# Patient Record
Sex: MALE | Race: BLACK OR AFRICAN AMERICAN | NOT HISPANIC OR LATINO | URBAN - METROPOLITAN AREA
[De-identification: names, ages, dates, MRNs, and addresses within clinical notes are randomized per-mention and may not be internally consistent; named-entity substitution may affect disease eponyms.]

---

## 2019-06-21 ENCOUNTER — INPATIENT (INPATIENT)
Facility: HOSPITAL | Age: 84
LOS: 3 days | Discharge: HOME | End: 2019-06-25
Attending: INTERNAL MEDICINE | Admitting: INTERNAL MEDICINE
Payer: MEDICARE

## 2019-06-21 VITALS
SYSTOLIC BLOOD PRESSURE: 102 MMHG | RESPIRATION RATE: 18 BRPM | HEART RATE: 75 BPM | TEMPERATURE: 98 F | DIASTOLIC BLOOD PRESSURE: 49 MMHG | OXYGEN SATURATION: 100 %

## 2019-06-21 DIAGNOSIS — R39.89 OTHER SYMPTOMS AND SIGNS INVOLVING THE GENITOURINARY SYSTEM: Chronic | ICD-10-CM

## 2019-06-21 LAB
ALBUMIN SERPL ELPH-MCNC: 4 G/DL — SIGNIFICANT CHANGE UP (ref 3.3–5)
ALP SERPL-CCNC: 79 U/L — SIGNIFICANT CHANGE UP (ref 40–120)
ALT FLD-CCNC: 38 U/L — SIGNIFICANT CHANGE UP (ref 4–41)
ANION GAP SERPL CALC-SCNC: 11 MMO/L — SIGNIFICANT CHANGE UP (ref 7–14)
APPEARANCE UR: CLEAR — SIGNIFICANT CHANGE UP
APTT BLD: 28.7 SEC — SIGNIFICANT CHANGE UP (ref 27.5–36.3)
AST SERPL-CCNC: 52 U/L — HIGH (ref 4–40)
BACTERIA # UR AUTO: SIGNIFICANT CHANGE UP
BASOPHILS # BLD AUTO: 0.04 K/UL — SIGNIFICANT CHANGE UP (ref 0–0.2)
BASOPHILS NFR BLD AUTO: 0.2 % — SIGNIFICANT CHANGE UP (ref 0–2)
BASOPHILS NFR SPEC: 0 % — SIGNIFICANT CHANGE UP (ref 0–2)
BILIRUB SERPL-MCNC: 0.5 MG/DL — SIGNIFICANT CHANGE UP (ref 0.2–1.2)
BILIRUB UR-MCNC: NEGATIVE — SIGNIFICANT CHANGE UP
BLASTS # FLD: 0 % — SIGNIFICANT CHANGE UP (ref 0–0)
BLOOD UR QL VISUAL: NEGATIVE — SIGNIFICANT CHANGE UP
BUN SERPL-MCNC: 18 MG/DL — SIGNIFICANT CHANGE UP (ref 7–23)
CALCIUM SERPL-MCNC: 9.8 MG/DL — SIGNIFICANT CHANGE UP (ref 8.4–10.5)
CHLORIDE SERPL-SCNC: 107 MMOL/L — SIGNIFICANT CHANGE UP (ref 98–107)
CO2 SERPL-SCNC: 25 MMOL/L — SIGNIFICANT CHANGE UP (ref 22–31)
COLOR SPEC: YELLOW — SIGNIFICANT CHANGE UP
CREAT SERPL-MCNC: 1.02 MG/DL — SIGNIFICANT CHANGE UP (ref 0.5–1.3)
EOSINOPHIL # BLD AUTO: 0.02 K/UL — SIGNIFICANT CHANGE UP (ref 0–0.5)
EOSINOPHIL NFR BLD AUTO: 0.1 % — SIGNIFICANT CHANGE UP (ref 0–6)
EOSINOPHIL NFR FLD: 0 % — SIGNIFICANT CHANGE UP (ref 0–6)
GIANT PLATELETS BLD QL SMEAR: PRESENT — SIGNIFICANT CHANGE UP
GLUCOSE SERPL-MCNC: 123 MG/DL — HIGH (ref 70–99)
GLUCOSE UR-MCNC: NEGATIVE — SIGNIFICANT CHANGE UP
HCT VFR BLD CALC: 37.4 % — LOW (ref 39–50)
HGB BLD-MCNC: 11.9 G/DL — LOW (ref 13–17)
IMM GRANULOCYTES NFR BLD AUTO: 1 % — SIGNIFICANT CHANGE UP (ref 0–1.5)
INR BLD: 1.47 — HIGH (ref 0.88–1.17)
KETONES UR-MCNC: NEGATIVE — SIGNIFICANT CHANGE UP
LEUKOCYTE ESTERASE UR-ACNC: NEGATIVE — SIGNIFICANT CHANGE UP
LYMPHOCYTES # BLD AUTO: 1.08 K/UL — SIGNIFICANT CHANGE UP (ref 1–3.3)
LYMPHOCYTES # BLD AUTO: 5.2 % — LOW (ref 13–44)
LYMPHOCYTES NFR SPEC AUTO: 6.1 % — LOW (ref 13–44)
MANUAL SMEAR VERIFICATION: SIGNIFICANT CHANGE UP
MCHC RBC-ENTMCNC: 30.6 PG — SIGNIFICANT CHANGE UP (ref 27–34)
MCHC RBC-ENTMCNC: 31.8 % — LOW (ref 32–36)
MCV RBC AUTO: 96.1 FL — SIGNIFICANT CHANGE UP (ref 80–100)
METAMYELOCYTES # FLD: 0 % — SIGNIFICANT CHANGE UP (ref 0–1)
MONOCYTES # BLD AUTO: 0.7 K/UL — SIGNIFICANT CHANGE UP (ref 0–0.9)
MONOCYTES NFR BLD AUTO: 3.4 % — SIGNIFICANT CHANGE UP (ref 2–14)
MONOCYTES NFR BLD: 2.6 % — SIGNIFICANT CHANGE UP (ref 2–9)
MORPHOLOGY BLD-IMP: NORMAL — SIGNIFICANT CHANGE UP
MUCOUS THREADS # UR AUTO: SIGNIFICANT CHANGE UP
MYELOCYTES NFR BLD: 0 % — SIGNIFICANT CHANGE UP (ref 0–0)
NEUTROPHIL AB SER-ACNC: 80 % — HIGH (ref 43–77)
NEUTROPHILS # BLD AUTO: 18.79 K/UL — HIGH (ref 1.8–7.4)
NEUTROPHILS NFR BLD AUTO: 90.1 % — HIGH (ref 43–77)
NEUTS BAND # BLD: 9.6 % — HIGH (ref 0–6)
NITRITE UR-MCNC: NEGATIVE — SIGNIFICANT CHANGE UP
NRBC # FLD: 0 K/UL — SIGNIFICANT CHANGE UP (ref 0–0)
OTHER - HEMATOLOGY %: 0 — SIGNIFICANT CHANGE UP
PH UR: 6 — SIGNIFICANT CHANGE UP (ref 5–8)
PLATELET # BLD AUTO: 196 K/UL — SIGNIFICANT CHANGE UP (ref 150–400)
PLATELET COUNT - ESTIMATE: NORMAL — SIGNIFICANT CHANGE UP
PMV BLD: 9.9 FL — SIGNIFICANT CHANGE UP (ref 7–13)
POTASSIUM SERPL-MCNC: 3.7 MMOL/L — SIGNIFICANT CHANGE UP (ref 3.5–5.3)
POTASSIUM SERPL-SCNC: 3.7 MMOL/L — SIGNIFICANT CHANGE UP (ref 3.5–5.3)
PROMYELOCYTES # FLD: 0 % — SIGNIFICANT CHANGE UP (ref 0–0)
PROT SERPL-MCNC: 7.6 G/DL — SIGNIFICANT CHANGE UP (ref 6–8.3)
PROT UR-MCNC: 200 — HIGH
PROTHROM AB SERPL-ACNC: 16.5 SEC — HIGH (ref 9.8–13.1)
RBC # BLD: 3.89 M/UL — LOW (ref 4.2–5.8)
RBC # FLD: 12.7 % — SIGNIFICANT CHANGE UP (ref 10.3–14.5)
RBC CASTS # UR COMP ASSIST: HIGH (ref 0–?)
REVIEW TO FOLLOW: YES — SIGNIFICANT CHANGE UP
SMUDGE CELLS # BLD: PRESENT — SIGNIFICANT CHANGE UP
SODIUM SERPL-SCNC: 143 MMOL/L — SIGNIFICANT CHANGE UP (ref 135–145)
SP GR SPEC: 1.04 — SIGNIFICANT CHANGE UP (ref 1–1.04)
SQUAMOUS # UR AUTO: SIGNIFICANT CHANGE UP
TROPONIN T, HIGH SENSITIVITY: < 6 NG/L — SIGNIFICANT CHANGE UP (ref ?–14)
UROBILINOGEN FLD QL: NORMAL — SIGNIFICANT CHANGE UP
VARIANT LYMPHS # BLD: 1.7 % — SIGNIFICANT CHANGE UP
WBC # BLD: 20.84 K/UL — HIGH (ref 3.8–10.5)
WBC # FLD AUTO: 20.84 K/UL — HIGH (ref 3.8–10.5)
WBC UR QL: HIGH (ref 0–?)

## 2019-06-21 PROCEDURE — 71046 X-RAY EXAM CHEST 2 VIEWS: CPT | Mod: 26

## 2019-06-21 RX ORDER — SODIUM CHLORIDE 9 MG/ML
1000 INJECTION INTRAMUSCULAR; INTRAVENOUS; SUBCUTANEOUS ONCE
Refills: 0 | Status: COMPLETED | OUTPATIENT
Start: 2019-06-21 | End: 2019-06-21

## 2019-06-21 RX ADMIN — SODIUM CHLORIDE 1000 MILLILITER(S): 9 INJECTION INTRAMUSCULAR; INTRAVENOUS; SUBCUTANEOUS at 23:56

## 2019-06-21 NOTE — ED PROVIDER NOTE - ATTENDING CONTRIBUTION TO CARE
DR. BLOCH, ATTENDING MD-  I performed a face to face bedside interview with patient regarding history of present illness, review of symptoms and past medical history. I completed an independent physical exam.  I have discussed patient's plan of care with the resident.  Patient examined well appearing NAD HEENT nml lungs clear, heart sounds nml lungs clear, nonfocal neuro exam, skin nml pulses nml. abd soft nontender, no CVA tenderness, oriented x 2.

## 2019-06-21 NOTE — ED PROVIDER NOTE - PHYSICAL EXAMINATION
Gen: AAOx2, non-toxic  Head: NCAT  HEENT: EOMI, PERRLA, oral mucosa moist, normal conjunctiva  Lung: CTAB, no respiratory distress, no wheezes/rhonchi/rales B/L, speaking in full sentences  CV: RRR, no murmurs, rubs or gallops  Abd: soft, NTND, no guarding, no CVA tenderness, no rebound tenderness  MSK: no visible deformities, full range of motion of all 4 exts  Neuro: No focal sensory or motor deficits  Skin: Warm, well perfused, no rash  Psych: normal affect.   ~Elier Wells MD (PGY1)

## 2019-06-21 NOTE — ED PROVIDER NOTE - CLINICAL SUMMARY MEDICAL DECISION MAKING FREE TEXT BOX
Elier Wells MD PGY1: 82 yo M Mauritian speaking translate id ([id number])  pmh of  dementia, BPH p/w syncopal episode x 2 hrs ago. c/o genital pain. Syncope 2nd to infxn like a UTI. cbc, cmp, ua, cxr

## 2019-06-21 NOTE — ED ADULT NURSE NOTE - OBJECTIVE STATEMENT
83yom a&ox2 (to self and place, baseline per family at bedside) brought to ed for syncopal episode this evening. pt daughter at bedside reports witnessing event, reports catching pt so he did not fall. pt family states he was unresponsive for approx 3 minutes then woke up. pt denies any s/s before episode. difficult to optain hx 2/2 dementia. pt family reports pt has been c/o genital pain/pain w/ urination. family denies incontinent episodes. pt currently breathing even/unlabored. abdomen soft, nontender, nondistended. skin warm, dry, and intact. 18g iv lock placed to L ac. labs sent. pt nsr on cardiac monitor. MD at bedside for further eval. will continue to monitor.

## 2019-06-21 NOTE — ED ADULT TRIAGE NOTE - CHIEF COMPLAINT QUOTE
f/s in field 119  had BM just prior to arrival passed after BM    pt denies any pain pt sometimes forgets to eat

## 2019-06-21 NOTE — ED PROVIDER NOTE - OBJECTIVE STATEMENT
82 yo M Tongan speaking translate id pmh of  p/w syncopal episode 84 yo M Tajik speaking translate id ([id number])  pmh of  dementia, BPH p/w syncopal episode x 2 hrs ago. Pt's daughter and wife at bed side states that pt was siting at the table where off balanced and wobbly then slough down and was unresponsive for about 3mins. Pt's family state that pt woke up n seem lethargic for 1 min and the called 911. Pt family states that pt has not been eating much and had fever for most of the day yesterday. Pt c/o pain in his private area.

## 2019-06-22 DIAGNOSIS — N40.0 BENIGN PROSTATIC HYPERPLASIA WITHOUT LOWER URINARY TRACT SYMPTOMS: ICD-10-CM

## 2019-06-22 DIAGNOSIS — J18.9 PNEUMONIA, UNSPECIFIED ORGANISM: ICD-10-CM

## 2019-06-22 DIAGNOSIS — F03.90 UNSPECIFIED DEMENTIA WITHOUT BEHAVIORAL DISTURBANCE: ICD-10-CM

## 2019-06-22 DIAGNOSIS — A41.9 SEPSIS, UNSPECIFIED ORGANISM: ICD-10-CM

## 2019-06-22 DIAGNOSIS — R55 SYNCOPE AND COLLAPSE: ICD-10-CM

## 2019-06-22 DIAGNOSIS — Z90.79 ACQUIRED ABSENCE OF OTHER GENITAL ORGAN(S): Chronic | ICD-10-CM

## 2019-06-22 DIAGNOSIS — Z29.9 ENCOUNTER FOR PROPHYLACTIC MEASURES, UNSPECIFIED: ICD-10-CM

## 2019-06-22 DIAGNOSIS — E86.0 DEHYDRATION: ICD-10-CM

## 2019-06-22 DIAGNOSIS — R41.82 ALTERED MENTAL STATUS, UNSPECIFIED: ICD-10-CM

## 2019-06-22 LAB
ANION GAP SERPL CALC-SCNC: 15 MMO/L — HIGH (ref 7–14)
B PERT DNA SPEC QL NAA+PROBE: NOT DETECTED — SIGNIFICANT CHANGE UP
BUN SERPL-MCNC: 13 MG/DL — SIGNIFICANT CHANGE UP (ref 7–23)
C PNEUM DNA SPEC QL NAA+PROBE: NOT DETECTED — SIGNIFICANT CHANGE UP
CALCIUM SERPL-MCNC: 9.7 MG/DL — SIGNIFICANT CHANGE UP (ref 8.4–10.5)
CHLORIDE SERPL-SCNC: 108 MMOL/L — HIGH (ref 98–107)
CHOLEST SERPL-MCNC: 104 MG/DL — LOW (ref 120–199)
CO2 SERPL-SCNC: 19 MMOL/L — LOW (ref 22–31)
CREAT SERPL-MCNC: 0.89 MG/DL — SIGNIFICANT CHANGE UP (ref 0.5–1.3)
FLUAV H1 2009 PAND RNA SPEC QL NAA+PROBE: NOT DETECTED — SIGNIFICANT CHANGE UP
FLUAV H1 RNA SPEC QL NAA+PROBE: NOT DETECTED — SIGNIFICANT CHANGE UP
FLUAV H3 RNA SPEC QL NAA+PROBE: NOT DETECTED — SIGNIFICANT CHANGE UP
FLUAV SUBTYP SPEC NAA+PROBE: NOT DETECTED — SIGNIFICANT CHANGE UP
FLUBV RNA SPEC QL NAA+PROBE: NOT DETECTED — SIGNIFICANT CHANGE UP
GLUCOSE SERPL-MCNC: 83 MG/DL — SIGNIFICANT CHANGE UP (ref 70–99)
HADV DNA SPEC QL NAA+PROBE: NOT DETECTED — SIGNIFICANT CHANGE UP
HBA1C BLD-MCNC: 5.9 % — HIGH (ref 4–5.6)
HCOV PNL SPEC NAA+PROBE: SIGNIFICANT CHANGE UP
HDLC SERPL-MCNC: 53 MG/DL — SIGNIFICANT CHANGE UP (ref 35–55)
HMPV RNA SPEC QL NAA+PROBE: NOT DETECTED — SIGNIFICANT CHANGE UP
HPIV1 RNA SPEC QL NAA+PROBE: NOT DETECTED — SIGNIFICANT CHANGE UP
HPIV2 RNA SPEC QL NAA+PROBE: NOT DETECTED — SIGNIFICANT CHANGE UP
HPIV3 RNA SPEC QL NAA+PROBE: NOT DETECTED — SIGNIFICANT CHANGE UP
HPIV4 RNA SPEC QL NAA+PROBE: NOT DETECTED — SIGNIFICANT CHANGE UP
LIPID PNL WITH DIRECT LDL SERPL: 48 MG/DL — SIGNIFICANT CHANGE UP
MAGNESIUM SERPL-MCNC: 2.1 MG/DL — SIGNIFICANT CHANGE UP (ref 1.6–2.6)
POTASSIUM SERPL-MCNC: 3.9 MMOL/L — SIGNIFICANT CHANGE UP (ref 3.5–5.3)
POTASSIUM SERPL-SCNC: 3.9 MMOL/L — SIGNIFICANT CHANGE UP (ref 3.5–5.3)
RSV RNA SPEC QL NAA+PROBE: NOT DETECTED — SIGNIFICANT CHANGE UP
RV+EV RNA SPEC QL NAA+PROBE: NOT DETECTED — SIGNIFICANT CHANGE UP
SODIUM SERPL-SCNC: 142 MMOL/L — SIGNIFICANT CHANGE UP (ref 135–145)
TRIGL SERPL-MCNC: 46 MG/DL — SIGNIFICANT CHANGE UP (ref 10–149)
TROPONIN T, HIGH SENSITIVITY: < 6 NG/L — SIGNIFICANT CHANGE UP (ref ?–14)
TSH SERPL-MCNC: 4.67 UIU/ML — HIGH (ref 0.27–4.2)

## 2019-06-22 RX ORDER — ACETAMINOPHEN 500 MG
650 TABLET ORAL EVERY 6 HOURS
Refills: 0 | Status: DISCONTINUED | OUTPATIENT
Start: 2019-06-22 | End: 2019-06-25

## 2019-06-22 RX ORDER — CEFTRIAXONE 500 MG/1
1000 INJECTION, POWDER, FOR SOLUTION INTRAMUSCULAR; INTRAVENOUS ONCE
Refills: 0 | Status: COMPLETED | OUTPATIENT
Start: 2019-06-22 | End: 2019-06-22

## 2019-06-22 RX ORDER — SODIUM CHLORIDE 9 MG/ML
1000 INJECTION INTRAMUSCULAR; INTRAVENOUS; SUBCUTANEOUS
Refills: 0 | Status: DISCONTINUED | OUTPATIENT
Start: 2019-06-22 | End: 2019-06-24

## 2019-06-22 RX ORDER — DONEPEZIL HYDROCHLORIDE 10 MG/1
5 TABLET, FILM COATED ORAL AT BEDTIME
Refills: 0 | Status: DISCONTINUED | OUTPATIENT
Start: 2019-06-22 | End: 2019-06-25

## 2019-06-22 RX ORDER — TAMSULOSIN HYDROCHLORIDE 0.4 MG/1
0.4 CAPSULE ORAL AT BEDTIME
Refills: 0 | Status: DISCONTINUED | OUTPATIENT
Start: 2019-06-22 | End: 2019-06-25

## 2019-06-22 RX ORDER — CEFTRIAXONE 500 MG/1
1000 INJECTION, POWDER, FOR SOLUTION INTRAMUSCULAR; INTRAVENOUS EVERY 24 HOURS
Refills: 0 | Status: DISCONTINUED | OUTPATIENT
Start: 2019-06-23 | End: 2019-06-25

## 2019-06-22 RX ORDER — ENOXAPARIN SODIUM 100 MG/ML
40 INJECTION SUBCUTANEOUS EVERY 24 HOURS
Refills: 0 | Status: DISCONTINUED | OUTPATIENT
Start: 2019-06-22 | End: 2019-06-25

## 2019-06-22 RX ORDER — IPRATROPIUM/ALBUTEROL SULFATE 18-103MCG
3 AEROSOL WITH ADAPTER (GRAM) INHALATION EVERY 6 HOURS
Refills: 0 | Status: DISCONTINUED | OUTPATIENT
Start: 2019-06-22 | End: 2019-06-25

## 2019-06-22 RX ORDER — AZITHROMYCIN 500 MG/1
500 TABLET, FILM COATED ORAL EVERY 24 HOURS
Refills: 0 | Status: DISCONTINUED | OUTPATIENT
Start: 2019-06-23 | End: 2019-06-24

## 2019-06-22 RX ORDER — AZITHROMYCIN 500 MG/1
500 TABLET, FILM COATED ORAL ONCE
Refills: 0 | Status: COMPLETED | OUTPATIENT
Start: 2019-06-22 | End: 2019-06-22

## 2019-06-22 RX ADMIN — SODIUM CHLORIDE 75 MILLILITER(S): 9 INJECTION INTRAMUSCULAR; INTRAVENOUS; SUBCUTANEOUS at 02:01

## 2019-06-22 RX ADMIN — CEFTRIAXONE 100 MILLIGRAM(S): 500 INJECTION, POWDER, FOR SOLUTION INTRAMUSCULAR; INTRAVENOUS at 00:14

## 2019-06-22 RX ADMIN — Medication 3 MILLILITER(S): at 15:47

## 2019-06-22 RX ADMIN — AZITHROMYCIN 250 MILLIGRAM(S): 500 TABLET, FILM COATED ORAL at 02:00

## 2019-06-22 RX ADMIN — Medication 1 MILLIGRAM(S): at 18:14

## 2019-06-22 RX ADMIN — ENOXAPARIN SODIUM 40 MILLIGRAM(S): 100 INJECTION SUBCUTANEOUS at 22:47

## 2019-06-22 RX ADMIN — Medication 1 MILLIGRAM(S): at 10:02

## 2019-06-22 NOTE — H&P ADULT - NSHPLANGTRANSLATORFT_GEN_A_CORE
Pt understands some English but is confused secondary to Dementia, son at bedside translated Pt understands some English but is more confused that his baseline Dementia, son at bedside translated along with daughter, grandson, and granddaughter

## 2019-06-22 NOTE — H&P ADULT - HISTORY OF PRESENT ILLNESS
82 y/o Cayman Islander speaking male (understands some English), with a PmHx of Dementia, BPH s/p TURP, presented to the [facility] ED with syncope. Pt is very confused and son at bedside was able to translate and provide information. Son states his father normally stays with him in [place] but was staying with his sister in [place] the last two nights because for the past few days his was c/o fever and a cough and they planning on taking him to his PCP in [place] this coming Monday. Two nights ago son states he was taking Nyquil to help with the fever, coughing and help him sleep. Last night, he was sitting at the table watching television and his daughter's house when his daughter called his name and he wasn't answering. As per pts son, his sister had stated after he wasn't responding to her (she was sitting across the room from him), she had gone to check up on him and he was unresponsive for about 2-3 minutes so she called 911.   As per son, pt denied any chest pain, sob, HA, dizziness, blurred vision, n/v, abd pain. Neg recent travel or sick contacts. Only complaint was coughing, decreased appetitie and fever over the past two days. In the [facility] ED, he was found to have a WBC of 20.84 and a CXR that was showing pneumonia. He was give Azithromycin and Ceftriaxone. He appears comfortable at this time and is being admitted to telemetry for syncope r/o acs and for Pneumonia.    =>Pt has advanced Dementia and was getting agitated in the ED. He was placed on a 1:1 for safety and then family showed up and he calmed down. 82 y/o Bruneian speaking male (understands some English), with a PmHx of Dementia, BPH s/p TURP, presented to the [facility] ED with syncope. Pt is very confused and son at bedside was able to translate and provide information. Son states his father normally stays with him in [place] but was staying with his sister in [place] the last two nights because for the past few days his was c/o fever and a cough and they planning on taking him to his PCP in [place] this coming Monday. Two nights ago son states he was taking Nyquil to help with the fever, coughing and help him sleep. Last night, he was sitting at the table watching television and his daughter's house when his daughter called his name and he wasn't answering. As per pts son, his sister had stated after he wasn't responding to her (she was sitting across the room from him), she had gone to check up on him and he was unresponsive for about 2-3 minutes so she called 911.   As per son, pt denied any chest pain, sob, HA, dizziness, blurred vision, n/v, abd pain. Neg recent travel or sick contacts. Only complaint was coughing, decreased appetitie and fever over the past two days. In the [facility] ED, he was found to have a WBC of 20.84 and a CXR that was showing pneumonia. He was give Azithromycin and Ceftriaxone. He appears comfortable at this time and is being admitted to telemetry for syncope r/o acs and for Pneumonia.  =>Pt has advanced Dementia and was getting agitated in the ED. He was placed on a 1:1 for safety and then family showed up and he calmed down.

## 2019-06-22 NOTE — PROVIDER CONTACT NOTE (OTHER) - SITUATION
Pt took off leads. Stood up on two legs on top of stretcher attempting to jump out. Nurse manager Jeni, and two PCAS had to prevent him from falling

## 2019-06-22 NOTE — H&P ADULT - NSHPSOCIALHISTORY_GEN_ALL_CORE
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

## 2019-06-22 NOTE — H&P ADULT - GASTROINTESTINAL DETAILS
no distention/no masses palpable/bowel sounds normal/nontender/no rebound tenderness/soft/no guarding

## 2019-06-22 NOTE — H&P ADULT - ATTENDING COMMENTS
I agree with the above information, changes made above as needed  in addition, pt admitted for:  - sepsis 2/2 bacterial pneumonia - iv abx, ivf, f/u labs, optimize lytes, f/u cultures, supportive care prn  - altered mental status - confusion - likely 2/2 active infectious state, treat underlying infection, fluid hydration, if abnormal mental status persists after 24 hours then will consider additional management/imaging   - dementia - keep surroundings intact, fall and aspiration precautions   - syncope and collapse - unspecified, f/u tte, tele  - dehydration - fluid hydration   adjust management per consultants   all medical team members management appreciated

## 2019-06-22 NOTE — H&P ADULT - PROBLEM SELECTOR PLAN 1
EKG/Telemetry, f/u ce x 2, mg, tsh, echo, CT head 2/2 bacterial pneumonia  iv abx, fluids, supportive care, f/u cultures

## 2019-06-22 NOTE — H&P ADULT - ASSESSMENT
84 y/o Polish speaking male (understands some English), with a PmHx of Dementia, BPH s/p TURP, presented to the [facility] ED with syncope. Admitted to telemetry for r/o acs and for Pneumonia.

## 2019-06-22 NOTE — PROVIDER CONTACT NOTE (OTHER) - ASSESSMENT
pt is not following commands. Continuously pulling off leads and trying to leave. stating "he is going home"

## 2019-06-22 NOTE — H&P ADULT - NSHPOUTPATIENTPROVIDERS_GEN_ALL_CORE
PCP: Dr. Renita Dhillon [phone] PCP: Dr. Renita Dhillon [phone] who admits to [place]

## 2019-06-22 NOTE — H&P ADULT - NEGATIVE CARDIOVASCULAR SYMPTOMS
no palpitations/no paroxysmal nocturnal dyspnea/no dyspnea on exertion/no peripheral edema/no chest pain

## 2019-06-22 NOTE — H&P ADULT - NSHPPHYSICALEXAM_GEN_ALL_CORE
Vital Signs Last 24 Hrs  T(C): 36.5 (22 Jun 2019 06:08), Max: 37.1 (21 Jun 2019 21:48)  T(F): 97.7 (22 Jun 2019 06:08), Max: 98.7 (21 Jun 2019 21:48)  HR: 88 (22 Jun 2019 06:08) (66 - 88)  BP: 104/64 (22 Jun 2019 06:08) (102/49 - 125/56)  BP(mean): --  RR: 17 (22 Jun 2019 06:08) (16 - 18)  SpO2: 99% (22 Jun 2019 06:08) (96% - 100%)    EKG: NSR @ 72, neg changes

## 2019-06-22 NOTE — H&P ADULT - BACK COMMENTS
noted a lipoma (non-tender and mobile) to the upper right thoracic region of his back - pt's son stated it has been there for about 10 years

## 2019-06-22 NOTE — CONSULT NOTE ADULT - SUBJECTIVE AND OBJECTIVE BOX
HISTORY OF PRESENT ILLNESS: HPI:    82 y/o Tajik speaking male (understands some English), translation obtained from his daughter and grandchildren, with a PMHx no significant cardiac hx, or know CAD, Dementia, BPH s/p TURP, presented to the [facility] ED with syncope. Per daughter, patient was staying in [place] the last two nights because for the past few days because he was c/o fever and a cough, has an appointment with his PCP in [place] this coming Monday. Two nights ago son states he was taking Nyquil to help with the fever, coughing and help him sleep, decreased appetite. Last night, he was sitting at the table watching television and his daughter's house when she called his name and he wasn't answering. As per pt's daughter he was not responding, lost consciousness for a couple of minutes, then 911 ws called. Per ED documentation, pt with WBC of 20.84 and a CXR that was showing pneumonia. Abx started. Pt has advanced Dementia and was getting agitated in the ED. He was placed on a 1:1 for safety, calm with family at bedside during interview/assessment. On assessment patient denies chest pain, endorses sob at times, no HA, dizziness, lightheadedness, vision changes, n/v/c abdominal pain, sick contacts or recent travel. Cardiology consulted for r/o ACS.     PAST MEDICAL & SURGICAL HISTORY:  Dementia  Enlarged prostate  S/P TURP (transurethral resection of prostate)    MEDICATIONS:  MEDICATIONS  (STANDING):  ALBUTerol/ipratropium for Nebulization 3 milliLiter(s) Nebulizer every 6 hours  donepezil 5 milliGRAM(s) Oral at bedtime  enoxaparin Injectable 40 milliGRAM(s) SubCutaneous every 24 hours  sodium chloride 0.9%. 1000 milliLiter(s) (75 mL/Hr) IV Continuous <Continuous>  tamsulosin 0.4 milliGRAM(s) Oral at bedtime    Allergies    No Known Allergies    FAMILY HISTORY:  No significant family history    Non-contributary for premature coronary disease or sudden cardiac death    SOCIAL HISTORY:    [X ] Non-smoker  [ ] Smoker  [ ] Alcohol      REVIEW OF SYSTEMS:  [ X]chest pain  [  ]shortness of breath  [  ]palpitations  [  ]syncope  [ ]near syncope [ ]upper extremity weakness   [ ] lower extremity weakness  [  ]diplopia  [  ]altered mental status   [  ]fevers  [ ]chills [ ]nausea  [ ]vomitting  [  ]dysphagia    [ ]abdominal pain  [ ]melena  [ ]BRBPR    [  ]epistaxis  [  ]rash    [ ]lower extremity edema        [X ] All others negative	- per family, unable to obtain from patient 2/2 language and hx of dementia   [ ] Unable to obtain      LABS:	 	    CARDIAC MARKERS:                        11.9   20.84 )-----------( 196      ( 21 Jun 2019 21:41 )             37.4     Hb Trend: 11.9<--    06-22    142  |  108<H>  |  13  ----------------------------<  83  3.9   |  19<L>  |  0.89    Ca    9.7      22 Jun 2019 09:30  Mg     2.1     06-22    TPro  7.6  /  Alb  4.0  /  TBili  0.5  /  DBili  x   /  AST  52<H>  /  ALT  38  /  AlkPhos  79  06-21    Creatinine Trend: 0.89<--, 1.02<--    Coags:  PT/INR - ( 21 Jun 2019 21:41 )   PT: 16.5 SEC;   INR: 1.47        PTT - ( 21 Jun 2019 21:41 )  PTT:28.7 SEC    proBNP:   Lipid Profile:   HgA1c: Hemoglobin A1C, Whole Blood: 5.9 % (06-22 @ 09:30)    TSH: Thyroid Stimulating Hormone, Serum: 4.67 uIU/mL (06-22 @ 09:30)    PHYSICAL EXAM:  T(C): 36.9 (06-22-19 @ 15:25), Max: 37.1 (06-21-19 @ 21:48)  HR: 84 (06-22-19 @ 15:47) (66 - 88)  BP: 120/62 (06-22-19 @ 15:25) (102/49 - 125/56)  RR: 16 (06-22-19 @ 15:25) (16 - 18)  SpO2: 97% (06-22-19 @ 15:47) (96% - 100%)  Wt(kg): --    I&O's Summary    CV: N S1 S2 1/6 SHAYY (+)2 Pulses B/l  Resp:  diminished BS B/L, normal effort  GI: (+) BS Soft, NT, ND  Lymph:  (-)Edema, (-)obvious lymphadenopathy  Skin: Warm to touch, Normal turgor    TELEMETRY: 	SR       ECG:  	NSR 72, P-R 162,  ms     RADIOLOGY:         CXR:   < from: Xray Chest 2 Views PA/Lat (06.21.19 @ 22:42) >  IMPRESSION:  Left basilar/retrocardiac opacity likely representing subsegmental   atelectasis versus pneumonia, also correlate with findings on already   pending chest CT. Clear remaining visualized lungs. No pleural effusions   or pneumothorax.    Aortic arch calcifications again noted. Cardiac and mediastinal grossly   within normal limits.    Trachea midline.    Generalized osteopenia and spinal degenerative change again noted.    HANNAH PEREA M.D., RADIOLOGY RESIDENT  This document has been electronically signed.  HASEEB ROGEL M.D., ATTENDING RADIOLOGIST  This document has been electronically signed. Jun 22 2019 10:08AM    < end of copied text >      ASSESSMENT/PLAN: 	    82 y/o Tajik speaking male (understands some English), translation obtained from his daughter and grandchildren, with a PMHx no significant cardiac hx, or know CAD, Dementia, BPH s/p TURP, presented to the [facility] ED with syncope. Cardiology consulted for r/o ACS.    -- no chest pain, intermittent sob  -- Trop HS 6 --> 6, doubt ACS, symptoms c/w underlying infectious process, PNA  -- recommend echo, eval lv function, structural heart   -- elevated TSH 4.67, f/u per primary team, endo consult?  -- leukocytosis w/u, f/u per primary team   -- CT Head r/o CVA, CT Chest eval PNA on xray   -- orthostatics if able, abx per primary team   -- further cardiac work up pending above                 --

## 2019-06-22 NOTE — H&P ADULT - NEGATIVE OPHTHALMOLOGIC SYMPTOMS
no photophobia/no loss of vision L/no loss of vision R/no diplopia/no blurred vision L/no blurred vision R

## 2019-06-22 NOTE — PROVIDER CONTACT NOTE (OTHER) - BACKGROUND
Pt has history of dementia, admitted for PNA and UTI. Per wife at bedside, he requires restraints when hospitalized

## 2019-06-23 LAB
AMMONIA BLD-MCNC: 23 UMOL/L — SIGNIFICANT CHANGE UP (ref 11–55)
ANION GAP SERPL CALC-SCNC: 12 MMO/L — SIGNIFICANT CHANGE UP (ref 7–14)
BACTERIA UR CULT: SIGNIFICANT CHANGE UP
BASOPHILS # BLD AUTO: 0.03 K/UL — SIGNIFICANT CHANGE UP (ref 0–0.2)
BASOPHILS NFR BLD AUTO: 0.2 % — SIGNIFICANT CHANGE UP (ref 0–2)
BUN SERPL-MCNC: 15 MG/DL — SIGNIFICANT CHANGE UP (ref 7–23)
CALCIUM SERPL-MCNC: 8.9 MG/DL — SIGNIFICANT CHANGE UP (ref 8.4–10.5)
CHLORIDE SERPL-SCNC: 110 MMOL/L — HIGH (ref 98–107)
CO2 SERPL-SCNC: 22 MMOL/L — SIGNIFICANT CHANGE UP (ref 22–31)
CREAT SERPL-MCNC: 1 MG/DL — SIGNIFICANT CHANGE UP (ref 0.5–1.3)
EOSINOPHIL # BLD AUTO: 0.13 K/UL — SIGNIFICANT CHANGE UP (ref 0–0.5)
EOSINOPHIL NFR BLD AUTO: 1 % — SIGNIFICANT CHANGE UP (ref 0–6)
GLUCOSE SERPL-MCNC: 83 MG/DL — SIGNIFICANT CHANGE UP (ref 70–99)
HCT VFR BLD CALC: 36.3 % — LOW (ref 39–50)
HGB BLD-MCNC: 11.5 G/DL — LOW (ref 13–17)
IMM GRANULOCYTES NFR BLD AUTO: 0.5 % — SIGNIFICANT CHANGE UP (ref 0–1.5)
L PNEUMO AG UR QL: NEGATIVE — SIGNIFICANT CHANGE UP
LACTATE SERPL-SCNC: 1.2 MMOL/L — SIGNIFICANT CHANGE UP (ref 0.5–2)
LYMPHOCYTES # BLD AUTO: 1.35 K/UL — SIGNIFICANT CHANGE UP (ref 1–3.3)
LYMPHOCYTES # BLD AUTO: 10.6 % — LOW (ref 13–44)
MCHC RBC-ENTMCNC: 30.9 PG — SIGNIFICANT CHANGE UP (ref 27–34)
MCHC RBC-ENTMCNC: 31.7 % — LOW (ref 32–36)
MCV RBC AUTO: 97.6 FL — SIGNIFICANT CHANGE UP (ref 80–100)
MONOCYTES # BLD AUTO: 0.54 K/UL — SIGNIFICANT CHANGE UP (ref 0–0.9)
MONOCYTES NFR BLD AUTO: 4.2 % — SIGNIFICANT CHANGE UP (ref 2–14)
NEUTROPHILS # BLD AUTO: 10.6 K/UL — HIGH (ref 1.8–7.4)
NEUTROPHILS NFR BLD AUTO: 83.5 % — HIGH (ref 43–77)
NRBC # FLD: 0 K/UL — SIGNIFICANT CHANGE UP (ref 0–0)
PLATELET # BLD AUTO: 247 K/UL — SIGNIFICANT CHANGE UP (ref 150–400)
PMV BLD: 10.2 FL — SIGNIFICANT CHANGE UP (ref 7–13)
POTASSIUM SERPL-MCNC: 3.5 MMOL/L — SIGNIFICANT CHANGE UP (ref 3.5–5.3)
POTASSIUM SERPL-SCNC: 3.5 MMOL/L — SIGNIFICANT CHANGE UP (ref 3.5–5.3)
RBC # BLD: 3.72 M/UL — LOW (ref 4.2–5.8)
RBC # FLD: 12.9 % — SIGNIFICANT CHANGE UP (ref 10.3–14.5)
SODIUM SERPL-SCNC: 144 MMOL/L — SIGNIFICANT CHANGE UP (ref 135–145)
SPECIMEN SOURCE: SIGNIFICANT CHANGE UP
T3 SERPL-MCNC: 77.1 NG/DL — LOW (ref 80–200)
T3FREE SERPL-MCNC: 2.13 PG/ML — SIGNIFICANT CHANGE UP (ref 1.8–4.6)
T4 AB SER-ACNC: 5.17 UG/DL — SIGNIFICANT CHANGE UP (ref 5.1–13)
T4 FREE SERPL-MCNC: 1.04 NG/DL — SIGNIFICANT CHANGE UP (ref 0.9–1.8)
WBC # BLD: 12.71 K/UL — HIGH (ref 3.8–10.5)
WBC # FLD AUTO: 12.71 K/UL — HIGH (ref 3.8–10.5)

## 2019-06-23 PROCEDURE — 70450 CT HEAD/BRAIN W/O DYE: CPT | Mod: 26

## 2019-06-23 PROCEDURE — 71250 CT THORAX DX C-: CPT | Mod: 26

## 2019-06-23 RX ADMIN — Medication 0.5 MILLIGRAM(S): at 23:10

## 2019-06-23 RX ADMIN — Medication 3 MILLILITER(S): at 10:49

## 2019-06-23 RX ADMIN — SODIUM CHLORIDE 75 MILLILITER(S): 9 INJECTION INTRAMUSCULAR; INTRAVENOUS; SUBCUTANEOUS at 11:34

## 2019-06-23 RX ADMIN — Medication 1 MILLIGRAM(S): at 21:15

## 2019-06-23 RX ADMIN — DONEPEZIL HYDROCHLORIDE 5 MILLIGRAM(S): 10 TABLET, FILM COATED ORAL at 21:16

## 2019-06-23 RX ADMIN — AZITHROMYCIN 250 MILLIGRAM(S): 500 TABLET, FILM COATED ORAL at 05:39

## 2019-06-23 RX ADMIN — ENOXAPARIN SODIUM 40 MILLIGRAM(S): 100 INJECTION SUBCUTANEOUS at 23:11

## 2019-06-23 RX ADMIN — TAMSULOSIN HYDROCHLORIDE 0.4 MILLIGRAM(S): 0.4 CAPSULE ORAL at 21:16

## 2019-06-23 RX ADMIN — Medication 3 MILLILITER(S): at 16:29

## 2019-06-23 RX ADMIN — CEFTRIAXONE 100 MILLIGRAM(S): 500 INJECTION, POWDER, FOR SOLUTION INTRAMUSCULAR; INTRAVENOUS at 05:02

## 2019-06-23 RX ADMIN — Medication 3 MILLILITER(S): at 22:05

## 2019-06-23 NOTE — PROGRESS NOTE ADULT - ATTENDING COMMENTS
CARDIOLOGY ATTENDING    Agree with above. Admitted with syncope. Baseline EKG is normal. Awaiting echo. If echo unremarkable then recommend outpatient event monitoring.

## 2019-06-23 NOTE — CONSULT NOTE ADULT - SUBJECTIVE AND OBJECTIVE BOX
Patient is a 83y old  Male who presents with a chief complaint of Syncope (2019 14:28)      HPI:  82 y/o Filipino speaking male (understands some English), with a PmHx of Dementia, BPH s/p TURP, presented to the [facility] ED with syncope. Pt is very confused and son at bedside was able to translate and provide information. Son states his father normally stays with him in [place] but was staying with his sister in [place] the last two nights because for the past few days his was c/o fever and a cough and they planning on taking him to his PCP in [place] this coming Monday. Two nights ago son states he was taking Nyquil to help with the fever, coughing and help him sleep. Last night, he was sitting at the table watching television and his daughter's house when his daughter called his name and he wasn't answering. As per pts son, his sister had stated after he wasn't responding to her (she was sitting across the room from him), she had gone to check up on him and he was unresponsive for about 2-3 minutes so she called 911.   As per son, pt denied any chest pain, sob, HA, dizziness, blurred vision, n/v, abd pain. Neg recent travel or sick contacts. Only complaint was coughing, decreased appetitie and fever over the past two days. In the [facility] ED, he was found to have a WBC of 20.84 and a CXR that was showing pneumonia. He was give Azithromycin and Ceftriaxone. He appears comfortable at this time and is being admitted to telemetry for syncope r/o acs and for Pneumonia.  =>Pt has advanced Dementia and was getting agitated in the ED. He was placed on a 1:1 for safety and then family showed up and he calmed down. (2019 07:58)      REVIEW OF SYSTEMS:    CONSTITUTIONAL: No fever, weight loss, or fatigue  EYES: No eye pain, visual disturbances, or discharge  ENMT:  No sore throat  NECK: No pain or stiffness  RESPIRATORY: No cough, wheezing, chills or hemoptysis; No shortness of breath  CARDIOVASCULAR: No chest pain, palpitations, dizziness, or leg swelling  GASTROINTESTINAL: No abdominal or epigastric pain. No nausea, vomiting, or hematemesis; No diarrhea or constipation. No melena or hematochezia.  GENITOURINARY: No dysuria, frequency, hematuria, or incontinence  NEUROLOGICAL: No headaches, memory loss, loss of strength, numbness, or tremors  SKIN: No itching, burning, rashes, or lesions   LYMPH NODES: No enlarged glands  MUSCULOSKELETAL: No joint pain or swelling; No muscle, back, or extremity pain      PAST MEDICAL & SURGICAL HISTORY:  Dementia  Enlarged prostate  S/P TURP (transurethral resection of prostate)      Allergies    No Known Allergies    Intolerances        FAMILY HISTORY:  No significant family history      SOCIAL HISTORY:        MEDICATIONS  (STANDING):  ALBUTerol/ipratropium for Nebulization 3 milliLiter(s) Nebulizer every 6 hours  azithromycin  IVPB 500 milliGRAM(s) IV Intermittent every 24 hours  cefTRIAXone   IVPB 1000 milliGRAM(s) IV Intermittent every 24 hours  donepezil 5 milliGRAM(s) Oral at bedtime  enoxaparin Injectable 40 milliGRAM(s) SubCutaneous every 24 hours  sodium chloride 0.9%. 1000 milliLiter(s) (75 mL/Hr) IV Continuous <Continuous>  tamsulosin 0.4 milliGRAM(s) Oral at bedtime    MEDICATIONS  (PRN):  acetaminophen   Tablet .. 650 milliGRAM(s) Oral every 6 hours PRN Temp greater or equal to 38C (100.4F), Mild Pain (1 - 3), Moderate Pain (4 - 6)  guaiFENesin   Syrup  (Sugar-Free) 200 milliGRAM(s) Oral every 6 hours PRN Cough      Vital Signs Last 24 Hrs  T(C): 36.7 (2019 14:27), Max: 37.1 (2019 05:51)  T(F): 98 (2019 14:27), Max: 98.7 (2019 05:51)  HR: 88 (2019 16:29) (69 - 107)  BP: 114/65 (2019 14:27) (114/65 - 128/68)  BP(mean): --  RR: 17 (2019 14:27) (16 - 18)  SpO2: 100% (2019 16:29) (90% - 100%)    PHYSICAL EXAM:    GENERAL: NAD, well-groomed  HEAD:  Atraumatic, Normocephalic  EYES: EOMI, PERRLA, conjunctiva and sclera clear  ENMT: No tonsillar erythema, exudates, or enlargement; Moist mucous membranes  NECK: Supple, No JVD  CHEST/LUNG: Clear to percussion bilaterally; No rales, rhonchi, wheezing, or rubs  HEART: Regular rate and rhythm; No murmurs, rubs, or gallops  ABDOMEN: Soft, Nontender, Nondistended; Bowel sounds present  EXTREMITIES:  2+ Peripheral Pulses, No clubbing, cyanosis, or edema  LYMPH: No lymphadenopathy noted  SKIN: No rashes or lesions    LABS:  CBC Full  -  ( 2019 05:40 )  WBC Count : 12.71 K/uL  RBC Count : 3.72 M/uL  Hemoglobin : 11.5 g/dL  Hematocrit : 36.3 %  Platelet Count - Automated : 247 K/uL  Mean Cell Volume : 97.6 fL  Mean Cell Hemoglobin : 30.9 pg  Mean Cell Hemoglobin Concentration : 31.7 %  Auto Neutrophil # : 10.60 K/uL  Auto Lymphocyte # : 1.35 K/uL  Auto Monocyte # : 0.54 K/uL  Auto Eosinophil # : 0.13 K/uL  Auto Basophil # : 0.03 K/uL  Auto Neutrophil % : 83.5 %  Auto Lymphocyte % : 10.6 %  Auto Monocyte % : 4.2 %  Auto Eosinophil % : 1.0 %  Auto Basophil % : 0.2 %      06-23    144  |  110<H>  |  15  ----------------------------<  83  3.5   |  22  |  1.00    Ca    8.9      2019 05:20  Mg     2.1     06-22    TPro  7.6  /  Alb  4.0  /  TBili  0.5  /  DBili  x   /  AST  52<H>  /  ALT  38  /  AlkPhos  79  06-21      LIVER FUNCTIONS - ( 2019 21:45 )  Alb: 4.0 g/dL / Pro: 7.6 g/dL / ALK PHOS: 79 u/L / ALT: 38 u/L / AST: 52 u/L / GGT: x                               MICROBIOLOGY:        Urinalysis Basic - ( 2019 22:43 )    Color: YELLOW / Appearance: CLEAR / S.036 / pH: 6.0  Gluc: NEGATIVE / Ketone: NEGATIVE  / Bili: NEGATIVE / Urobili: NORMAL   Blood: NEGATIVE / Protein: 200 / Nitrite: NEGATIVE   Leuk Esterase: NEGATIVE / RBC: 6-10 / WBC 6-10   Sq Epi: FEW / Non Sq Epi: x / Bacteria: FEW                RADIOLOGY: Patient is a 83y old  Male who presents with a chief complaint of Syncope (2019 14:28)      HPI:    84 y/o British speaking male (understands some English), with a PmHx of Dementia, BPH s/p TURP, presented to the [facility] ED with syncope. Pt is very confused and son at bedside was able to translate and provide information. Son states his father normally stays with him in [place] but was staying with his sister in [place] the last two nights because for the past few days his was c/o fever and a cough and they planning on taking him to his PCP in [place] this coming Monday.     Two nights ago son states he was taking Nyquil to help with the fever, coughing and help him sleep. Last night, he was sitting at the table watching television and his daughter's house when his daughter called his name and he wasn't answering. As per pts son, his sister had stated after he wasn't responding to her (she was sitting across the room from him), she had gone to check up on him and he was unresponsive for about 2-3 minutes so she called 911.     As per son, pt denied any chest pain, sob, HA, dizziness, blurred vision, n/v, abd pain. Neg recent travel or sick contacts. Only complaint was coughing, decreased appetitie and fever over the past two days. In the [facility] ED, he was found to have a WBC of 20.84 and a CXR that was showing pneumonia. He was give Azithromycin and Ceftriaxone. He appears comfortable at this time and is being admitted to telemetry for syncope r/o acs and for Pneumonia.  =>Pt has advanced Dementia and was getting agitated in the ED. He was placed on a 1:1 for safety and then family showed up and he calmed down. (2019 07:58)    ER vss.  Pt afebrile.  WBC 20.8 --> 12.7.  UA (-).  RVP (-).  Leg Ag (-).  Bcx (-) at 24 hrs.  Ucx (-).  CT chest with LLL pna and patchy GGOs in RML and RUL.       ID consult called for further abx management.            REVIEW OF SYSTEMS:    CONSTITUTIONAL: No fever, weight loss, or fatigue  EYES: No eye pain, visual disturbances, or discharge  ENMT:  No sore throat  NECK: No pain or stiffness  RESPIRATORY: No cough, wheezing, chills or hemoptysis; No shortness of breath  CARDIOVASCULAR: No chest pain, palpitations, dizziness, or leg swelling  GASTROINTESTINAL: No abdominal or epigastric pain. No nausea, vomiting, or hematemesis; No diarrhea or constipation. No melena or hematochezia.  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 1000 milliLiter(s) (75 mL/Hr) IV Continuous <Continuous>  tamsulosin 0.4 milliGRAM(s) Oral at bedtime    MEDICATIONS  (PRN):  acetaminophen   Tablet .. 650 milliGRAM(s) Oral every 6 hours PRN Temp greater or equal to 38C (100.4F), Mild Pain (1 - 3), Moderate Pain (4 - 6)  guaiFENesin   Syrup  (Sugar-Free) 200 milliGRAM(s) Oral every 6 hours PRN Cough      Vital Signs Last 24 Hrs  T(C): 36.7 (2019 14:27), Max: 37.1 (2019 05:51)  T(F): 98 (2019 14:27), Max: 98.7 (2019 05:51)  HR: 88 (2019 16:29) (69 - 107)  BP: 114/65 (2019 14:27) (114/65 - 128/68)  BP(mean): --  RR: 17 (2019 14:27) (16 - 18)  SpO2: 100% (2019 16:29) (90% - 100%)    PHYSICAL EXAM:    GENERAL: NAD, well-groomed  HEAD:  Atraumatic, Normocephalic  EYES: EOMI, PERRLA, conjunctiva and sclera clear  ENMT: No tonsillar erythema, exudates, or enlargement; Moist mucous membranes  NECK: Supple, No JVD  CHEST/LUNG: Clear to percussion bilaterally; No rales, rhonchi, wheezing, or rubs  HEART: Regular rate and rhythm; No murmurs, rubs, or gallops  ABDOMEN: Soft, Nontender, Nondistended; Bowel sounds present  EXTREMITIES:  2+ Peripheral Pulses, No clubbing, cyanosis, or edema  LYMPH: No lymphadenopathy noted  SKIN: No rashes or lesions    LABS:  CBC Full  -  ( 2019 05:40 )  WBC Count : 12.71 K/uL  RBC Count : 3.72 M/uL  Hemoglobin : 11.5 g/dL  Hematocrit : 36.3 %  Platelet Count - Automated : 247 K/uL  Mean Cell Volume : 97.6 fL  Mean Cell Hemoglobin : 30.9 pg  Mean Cell Hemoglobin Concentration : 31.7 %  Auto Neutrophil # : 10.60 K/uL  Auto Lymphocyte # : 1.35 K/uL  Auto Monocyte # : 0.54 K/uL  Auto Eosinophil # : 0.13 K/uL  Auto Basophil # : 0.03 K/uL  Auto Neutrophil % : 83.5 %  Auto Lymphocyte % : 10.6 %  Auto Monocyte % : 4.2 %  Auto Eosinophil % : 1.0 %  Auto Basophil % : 0.2 %          144  |  110<H>  |  15  ----------------------------<  83  3.5   |  22  |  1.00    Ca    8.9      2019 05:20  Mg     2.1         TPro  7.6  /  Alb  4.0  /  TBili  0.5  /  DBili  x   /  AST  52<H>  /  ALT  38  /  AlkPhos  79  06-21      LIVER FUNCTIONS - ( 2019 21:45 )  Alb: 4.0 g/dL / Pro: 7.6 g/dL / ALK PHOS: 79 u/L / ALT: 38 u/L / AST: 52 u/L / GGT: x                               MICROBIOLOGY:        Urinalysis Basic - ( 2019 22:43 )    Color: YELLOW / Appearance: CLEAR / S.036 / pH: 6.0  Gluc: NEGATIVE / Ketone: NEGATIVE  / Bili: NEGATIVE / Urobili: NORMAL   Blood: NEGATIVE / Protein: 200 / Nitrite: NEGATIVE   Leuk Esterase: NEGATIVE / RBC: 6-10 / WBC 6-10   Sq Epi: FEW / Non Sq Epi: x / Bacteria: FEW      Culture - Blood (19 @ 02:31)    Culture - Blood:   NO ORGANISMS ISOLATED  NO ORGANISMS ISOLATED AT 24 HOURS    Specimen Source: BLOOD VENOUS    Culture - Blood (19 @ 02:31)    Culture - Blood:   NO ORGANISMS ISOLATED  NO ORGANISMS ISOLATED AT 24 HOURS    Specimen Source: BLOOD PERIPHERAL    Culture - Urine (19 @ 22:57)    Culture - Urine:   NO GROWTH AT 24 HOURS    Specimen Source: URINE MIDSTREAM    Rapid Respiratory Viral Panel (19 @ 10:24)    Adenovirus (RapRVP): Not Detected    Influenza A (RapRVP): Not Detected    Influenza AH1 2009 (RapRVP): Not Detected    Influenza AH1 (RapRVP): Not Detected    Influenza AH3 (RapRVP): Not Detected    Influenza B (RapRVP): Not Detected    Parainfluenza 1 (RapRVP): Not Detected    Parainfluenza 2 (RapRVP): Not Detected    Parainfluenza 3 (RapRVP): Not Detected    Parainfluenza 4 (RapRVP): Not Detected    Resp Syncytial Virus (RapRVP): Not Detected    Chlamydia pneumoniae (RapRVP): Not Detected    Mycoplasma pneumoniae (RapRVP): Not Detected    Entero/Rhinovirus (RapRVP): Not Detected    hMPV (RapRVP): Not Detected    Coronavirus (229E,HKU1,NL63,OC43): Not Detected This Respiratory Panel uses polymerase chain reaction (PCR)  to detect for adenovirus; coronavirus (HKU1, NL63, 229E,  OC43); human metapneumovirus (hMPV); human  enterovirus/rhinovirus (Entero/RV); influenza A; influenza  A/H1: influenza A/H3; influenza A/H1-2009; influenza B;  parainfluenza viruses 1,2,3,4; respiratory syncytial virus;  Mycoplasma pneumoniae; and Chlamydophila pneumoniae.    Legionella pneumophila Antigen, Urine (19 @ 12:40)    Legionella pneumophila Antigen, Urine: Negative              RADIOLOGY:      < from: CT Head No Cont (19 @ 10:13) >  FINDINGS:   No acute intracranial hemorrhage, mass effect, or midline shift. No   abnormal extra-axial collections. The basal cisterns are patent without   evidence of central herniation. Bilateral physiologic basal ganglia   calcifications. The sulci and ventricles are within normal limits for the   patient's age. Mild patchy periventricular white matter hypoattenuation,   likely the sequela of chronic microvascular ischemic disease.    The calvarium is intact. The softtissues of the scalp are unremarkable.   The visualized paranasal sinuses and tympanomastoid cavities are clear.      IMPRESSION:     No CT evidence of acute intracranial hemorrhage, mass effect, or midline   shift.     < end of copied text >        < from: CT Chest No Cont (19 @ 10:11) >    FINDINGS:    LUNGS AND AIRWAYS: Patent central airways.  Left lower lobe consolidation   without significant volume loss. Patchy ground glass opacities in the   right middle lobe and left upper lobe. Right lower lobe passive   atelectasis secondary to adjacent pleural effusion. Narrowing left main   bronchus. Distal residence distorts and is minimal    PLEURA: Trace pleural effusions bilaterally. No pneumothorax.    MEDIASTINUM AND BURKE: No lymphadenopathy.    VESSELS: Normal caliber aorta and main pulmonary artery.    HEART: Left ventricular enlargement. Trace pericardial effusion. Coronary   artery calcifications.    CHEST WALL AND LOWER NECK: Within normal limits.    VISUALIZED UPPER ABDOMEN: Within normal limits.    BONES: Degenerative changes of the spine.    IMPRESSION:     Predominantly left lower lobe pneumonia. Patchy ground glass opacities in   the right middle and upper lobe may represent multifocal spread of   disease.    < end of copied text >          < from: Xray Chest 2 Views PA/Lat (19 @ 22:42) >    IMPRESSION:  Left basilar/retrocardiac opacity likely representing subsegmental   atelectasis versus pneumonia, also correlate with findings on already   pending chest CT. Clear remaining visualized lungs. No pleural effusions   or pneumothorax.    Aortic arch calcifications again noted. Cardiac and mediastinal grossly   within normal limits.    Trachea midline.    Generalized osteopenia and spinal degenerative change again noted.    < end of copied text >

## 2019-06-23 NOTE — PROGRESS NOTE ADULT - PROBLEM SELECTOR PLAN 1
improving   2/2 bacterial pneumonia  iv abx, fluids, supportive care, f/u cultures  adjust managmeent per consultants

## 2019-06-23 NOTE — PROGRESS NOTE ADULT - PROBLEM SELECTOR PLAN 8
- altered mental status - confusion - greatly improving, likely 2/2 infectious state and dehydration

## 2019-06-24 DIAGNOSIS — E03.9 HYPOTHYROIDISM, UNSPECIFIED: ICD-10-CM

## 2019-06-24 LAB
ANION GAP SERPL CALC-SCNC: 13 MMO/L — SIGNIFICANT CHANGE UP (ref 7–14)
BASOPHILS # BLD AUTO: 0.02 K/UL — SIGNIFICANT CHANGE UP (ref 0–0.2)
BASOPHILS NFR BLD AUTO: 0.2 % — SIGNIFICANT CHANGE UP (ref 0–2)
BUN SERPL-MCNC: 11 MG/DL — SIGNIFICANT CHANGE UP (ref 7–23)
CALCIUM SERPL-MCNC: 9.1 MG/DL — SIGNIFICANT CHANGE UP (ref 8.4–10.5)
CHLORIDE SERPL-SCNC: 105 MMOL/L — SIGNIFICANT CHANGE UP (ref 98–107)
CO2 SERPL-SCNC: 22 MMOL/L — SIGNIFICANT CHANGE UP (ref 22–31)
CREAT SERPL-MCNC: 0.85 MG/DL — SIGNIFICANT CHANGE UP (ref 0.5–1.3)
EOSINOPHIL # BLD AUTO: 0.13 K/UL — SIGNIFICANT CHANGE UP (ref 0–0.5)
EOSINOPHIL NFR BLD AUTO: 1.6 % — SIGNIFICANT CHANGE UP (ref 0–6)
GLUCOSE SERPL-MCNC: 121 MG/DL — HIGH (ref 70–99)
HCT VFR BLD CALC: 34.4 % — LOW (ref 39–50)
HGB BLD-MCNC: 11 G/DL — LOW (ref 13–17)
IMM GRANULOCYTES NFR BLD AUTO: 1.1 % — SIGNIFICANT CHANGE UP (ref 0–1.5)
LYMPHOCYTES # BLD AUTO: 1.12 K/UL — SIGNIFICANT CHANGE UP (ref 1–3.3)
LYMPHOCYTES # BLD AUTO: 13.5 % — SIGNIFICANT CHANGE UP (ref 13–44)
MAGNESIUM SERPL-MCNC: 1.9 MG/DL — SIGNIFICANT CHANGE UP (ref 1.6–2.6)
MCHC RBC-ENTMCNC: 30.3 PG — SIGNIFICANT CHANGE UP (ref 27–34)
MCHC RBC-ENTMCNC: 32 % — SIGNIFICANT CHANGE UP (ref 32–36)
MCV RBC AUTO: 94.8 FL — SIGNIFICANT CHANGE UP (ref 80–100)
MONOCYTES # BLD AUTO: 0.6 K/UL — SIGNIFICANT CHANGE UP (ref 0–0.9)
MONOCYTES NFR BLD AUTO: 7.2 % — SIGNIFICANT CHANGE UP (ref 2–14)
NEUTROPHILS # BLD AUTO: 6.36 K/UL — SIGNIFICANT CHANGE UP (ref 1.8–7.4)
NEUTROPHILS NFR BLD AUTO: 76.4 % — SIGNIFICANT CHANGE UP (ref 43–77)
NRBC # FLD: 0 K/UL — SIGNIFICANT CHANGE UP (ref 0–0)
PLATELET # BLD AUTO: 218 K/UL — SIGNIFICANT CHANGE UP (ref 150–400)
PMV BLD: 9.5 FL — SIGNIFICANT CHANGE UP (ref 7–13)
POTASSIUM SERPL-MCNC: 3.3 MMOL/L — LOW (ref 3.5–5.3)
POTASSIUM SERPL-SCNC: 3.3 MMOL/L — LOW (ref 3.5–5.3)
RBC # BLD: 3.63 M/UL — LOW (ref 4.2–5.8)
RBC # FLD: 12.5 % — SIGNIFICANT CHANGE UP (ref 10.3–14.5)
SODIUM SERPL-SCNC: 140 MMOL/L — SIGNIFICANT CHANGE UP (ref 135–145)
WBC # BLD: 8.32 K/UL — SIGNIFICANT CHANGE UP (ref 3.8–10.5)
WBC # FLD AUTO: 8.32 K/UL — SIGNIFICANT CHANGE UP (ref 3.8–10.5)

## 2019-06-24 PROCEDURE — 93306 TTE W/DOPPLER COMPLETE: CPT | Mod: 26

## 2019-06-24 RX ORDER — POTASSIUM CHLORIDE 20 MEQ
20 PACKET (EA) ORAL EVERY 4 HOURS
Refills: 0 | Status: COMPLETED | OUTPATIENT
Start: 2019-06-24 | End: 2019-06-24

## 2019-06-24 RX ORDER — LEVOTHYROXINE SODIUM 125 MCG
25 TABLET ORAL DAILY
Refills: 0 | Status: DISCONTINUED | OUTPATIENT
Start: 2019-06-24 | End: 2019-06-25

## 2019-06-24 RX ORDER — LANOLIN ALCOHOL/MO/W.PET/CERES
3 CREAM (GRAM) TOPICAL AT BEDTIME
Refills: 0 | Status: DISCONTINUED | OUTPATIENT
Start: 2019-06-24 | End: 2019-06-25

## 2019-06-24 RX ADMIN — Medication 20 MILLIEQUIVALENT(S): at 13:20

## 2019-06-24 RX ADMIN — CEFTRIAXONE 100 MILLIGRAM(S): 500 INJECTION, POWDER, FOR SOLUTION INTRAMUSCULAR; INTRAVENOUS at 05:09

## 2019-06-24 RX ADMIN — TAMSULOSIN HYDROCHLORIDE 0.4 MILLIGRAM(S): 0.4 CAPSULE ORAL at 21:38

## 2019-06-24 RX ADMIN — Medication 3 MILLILITER(S): at 16:19

## 2019-06-24 RX ADMIN — Medication 20 MILLIEQUIVALENT(S): at 11:00

## 2019-06-24 RX ADMIN — DONEPEZIL HYDROCHLORIDE 5 MILLIGRAM(S): 10 TABLET, FILM COATED ORAL at 21:38

## 2019-06-24 RX ADMIN — Medication 3 MILLILITER(S): at 10:47

## 2019-06-24 RX ADMIN — Medication 3 MILLIGRAM(S): at 21:38

## 2019-06-24 RX ADMIN — Medication 3 MILLILITER(S): at 03:33

## 2019-06-24 RX ADMIN — AZITHROMYCIN 250 MILLIGRAM(S): 500 TABLET, FILM COATED ORAL at 05:54

## 2019-06-24 RX ADMIN — ENOXAPARIN SODIUM 40 MILLIGRAM(S): 100 INJECTION SUBCUTANEOUS at 22:52

## 2019-06-24 NOTE — PROGRESS NOTE ADULT - ATTENDING COMMENTS
Patient seen and examined.  Agree with above.   -Abx for PNA per primary team  -check TTE to eval LV function    Elina Espinal MD

## 2019-06-24 NOTE — CONSULT NOTE ADULT - SUBJECTIVE AND OBJECTIVE BOX
[facility]   MD EDWAR SHIELDS MD ANGELA WONG, PA    TEL:  OFFICE: [phone]  DR VALENCIA CELL: [phone]  DR. HARDING CELL: [phone]  MAXIMILIANO FUNEZ CELL: [phone]      HPI:  history from chart, patient a+0x1 self only  82 y/o American speaking male (understands some English), with a PmHx of Dementia, BPH s/p TURP, presented to the [facility] ED with syncope. + fever cough found to have PNA. BIBMES sec to unresponsiveness. nephrology consulted for proteinuria and hematuria  no family at bedside. unable to get much history from patient.      Allergies:  No Known Allergies      PAST MEDICAL & SURGICAL HISTORY:  Dementia  Enlarged prostate  S/P TURP (transurethral resection of prostate)      Home Medications Reviewed    Hospital Medications:   MEDICATIONS  (STANDING):  ALBUTerol/ipratropium for Nebulization 3 milliLiter(s) Nebulizer every 6 hours  azithromycin  IVPB 500 milliGRAM(s) IV Intermittent every 24 hours  cefTRIAXone   IVPB 1000 milliGRAM(s) IV Intermittent every 24 hours  donepezil 5 milliGRAM(s) Oral at bedtime  enoxaparin Injectable 40 milliGRAM(s) SubCutaneous every 24 hours  levothyroxine 25 MICROGram(s) Oral daily  tamsulosin 0.4 milliGRAM(s) Oral at bedtime      SOCIAL HISTORY:  Denies ETOh, Smoking,     FAMILY HISTORY:  No significant family history      REVIEW OF SYSTEMS:  patient only A+Ox1 self only. denied complaints     VITALS:  T(F): 98.8 (19 @ 05:06), Max: 98.8 (19 @ 05:06)  HR: 84 (19 @ 10:53)  BP: 118/64 (19 @ 05:06)  RR: 17 (19 @ 05:06)  SpO2: 100% (19 @ 05:06)  Wt(kg): --     @ 07:01  -   @ 07:00  --------------------------------------------------------  IN: 0 mL / OUT: 87 mL / NET: -87 mL          PHYSICAL EXAM:  Constitutional: NAD  HEENT: anicteric sclera, oropharynx clear, MMM  Neck: No JVD  Respiratory: CTAB, no wheezes, rales or rhonchi  Cardiovascular: S1, S2, RRR  Gastrointestinal: BS+, soft, NT/ND  Extremities: No cyanosis or clubbing. No peripheral edema  Neurological: A/O x 1  Psychiatric: Normal mood, normal affect  : No CVA tenderness. No reid.   Skin: No rashes    LABS:      140  |  105  |  11  ----------------------------<  121<H>  3.3<L>   |  22  |  0.85    Ca    9.1      2019 06:30  Mg     1.9           Creatinine Trend: 0.85 <--, 1.00 <--, 0.89 <--, 1.02 <--                        11.0   8.32  )-----------( 218      ( 2019 06:30 )             34.4     Urine Studies:  Urinalysis Basic - ( 2019 22:43 )    Color: YELLOW / Appearance: CLEAR / S.036 / pH: 6.0  Gluc: NEGATIVE / Ketone: NEGATIVE  / Bili: NEGATIVE / Urobili: NORMAL   Blood: NEGATIVE / Protein: 200 / Nitrite: NEGATIVE   Leuk Esterase: NEGATIVE / RBC: 6-10 / WBC 6-10   Sq Epi: FEW / Non Sq Epi:  / Bacteria: FEW          RADIOLOGY & ADDITIONAL STUDIES:

## 2019-06-24 NOTE — CONSULT NOTE ADULT - ASSESSMENT
82 y/o Liberian speaking male (understands some English), with a PmHx of Dementia, BPH s/p TURP, presented to the [facility] ED with syncope. + fever cough found to have PNA. BIBMES sec to unresponsiveness. nephrology consulted for proteinuria and hematuria    Proteinuria  ? etiology  urine cx neg  repeat UA  check urine p/c ratio    hematuria  hx of TURP  check kidney us    hypokalemia  supplemented  monitor    syncope  f/u cardio  pending echo
A/P  Hypothyroidism: 83y Male with no history of hypothyroidism current labs show subclinical hypothyroid state, he has dementia, no goiter.  Dementia: on medications, stable, monitored.    Wander Frye MD  Cell: [phone]  Office: [phone]
82 y/o [place] speaking male (understands some English), with a PmHx of Dementia, BPH s/p TURP, presented to the [facility] ED with syncope. Pt is very confused and son at bedside was able to translate and provide information. Son states his father normally stays with him in [place] but was staying with his sister in [place] the last two nights because for the past few days his was c/o fever and a cough and they planning on taking him to his PCP in [place] this coming Monday.     Two nights ago son states he was taking Nyquil to help with the fever, coughing and help him sleep. Last night, he was sitting at the table watching television and his daughter's house when his daughter called his name and he wasn't answering. As per pts son, his sister had stated after he wasn't responding to her (she was sitting across the room from him), she had gone to check up on him and he was unresponsive for about 2-3 minutes so she called 911.     As per son, pt denied any chest pain, sob, HA, dizziness, blurred vision, n/v, abd pain. Neg recent travel or sick contacts. Only complaint was coughing, decreased appetitie and fever over the past two days. In the [facility] ED, he was found to have a WBC of 20.84 and a CXR that was showing pneumonia. He was give Azithromycin and Ceftriaxone. He appears comfortable at this time and is being admitted to telemetry for syncope r/o acs and for Pneumonia.  =>Pt has advanced Dementia and was getting agitated in the ED. He was placed on a 1:1 for safety and then family showed up and he calmed down. (22 Jun 2019 07:58)    ER vss.  Pt afebrile.  WBC 20.8 --> 12.7.  UA (-).  RVP (-).  Leg Ag (-).  Bcx (-) at 24 hrs.  Ucx (-).  CT chest with LLL pna and patchy GGOs in RML and RUL.       ID consult called for further abx management.       CAP:    - Pt with sepsis presentation (fever - at home, leukocytosis).  CT chest with predominantly LLL pna with multifocal spread.      - Agree with rocephin/azithromycin.  Check Legionella Ag.     - F/u blood cultures x 2    - Monitor WBC and temp curve.    Will follow,    Leslie Beckman  [phone]

## 2019-06-24 NOTE — CHART NOTE - NSCHARTNOTEFT_GEN_A_CORE
Notified by RN pt is agitated, not redirectable, confused, Ativan 1mg IVP  was given earlier in the shift  for agitation with not much improvement, additional ativan 0.5 mg IV x 1 ordered, pt was a bit more cooperative but still very confused, AO x 1. will need psych eval in am, will c/t monitor.

## 2019-06-24 NOTE — CONSULT NOTE ADULT - SUBJECTIVE AND OBJECTIVE BOX
HPI:  82 y/o Burkinan speaking male (understands some English), with a PmHx of Dementia, BPH s/p TURP, presented to the [facility] ED with syncope. Pt is very confused and son at bedside was able to translate and provide information. Son states his father normally stays with him in [place] but was staying with his sister in [place] the last two nights because for the past few days his was c/o fever and a cough and they planning on taking him to his PCP in [place] this coming Monday. Two nights ago son states he was taking Nyquil to help with the fever, coughing and help him sleep. Last night, he was sitting at the table watching television and his daughter's house when his daughter called his name and he wasn't answering. As per pts son, his sister had stated after he wasn't responding to her (she was sitting across the room from him), she had gone to check up on him and he was unresponsive for about 2-3 minutes so she called 911.   As per son, pt denied any chest pain, sob, HA, dizziness, blurred vision, n/v, abd pain. Neg recent travel or sick contacts. Only complaint was coughing, decreased appetitie and fever over the past two days. In the [facility] ED, he was found to have a WBC of 20.84 and a CXR that was showing pneumonia. He was give Azithromycin and Ceftriaxone. He appears comfortable at this time and is being admitted to telemetry for syncope r/o acs and for Pneumonia.  =>Pt has advanced Dementia and was getting agitated in the ED. He was placed on a 1:1 for safety and then family showed up and he calmed down. (22 Jun 2019 07:58)  Patient apparently has no  history of hypothyroidism no neck pain no dysphagia.  PAST MEDICAL & SURGICAL HISTORY:  Dementia  Enlarged prostate  S/P TURP (transurethral resection of prostate)      FAMILY HISTORY:  No significant family history      Social History:    Outpatient Medications:    MEDICATIONS  (STANDING):  ALBUTerol/ipratropium for Nebulization 3 milliLiter(s) Nebulizer every 6 hours  azithromycin  IVPB 500 milliGRAM(s) IV Intermittent every 24 hours  cefTRIAXone   IVPB 1000 milliGRAM(s) IV Intermittent every 24 hours  donepezil 5 milliGRAM(s) Oral at bedtime  enoxaparin Injectable 40 milliGRAM(s) SubCutaneous every 24 hours  levothyroxine 25 MICROGram(s) Oral daily  sodium chloride 0.9%. 1000 milliLiter(s) (75 mL/Hr) IV Continuous <Continuous>  tamsulosin 0.4 milliGRAM(s) Oral at bedtime    MEDICATIONS  (PRN):  acetaminophen   Tablet .. 650 milliGRAM(s) Oral every 6 hours PRN Temp greater or equal to 38C (100.4F), Mild Pain (1 - 3), Moderate Pain (4 - 6)  guaiFENesin   Syrup  (Sugar-Free) 200 milliGRAM(s) Oral every 6 hours PRN Cough  melatonin 3 milliGRAM(s) Oral at bedtime PRN Insomnia      Allergies    No Known Allergies    Intolerances      Review of Systems:  Constitutional: No fever, no chills  Eyes: No blurry vision  Neuro: No tremors  HEENT: No pain, no neck swelling  Cardiovascular: No chest pain, no palpitations  Respiratory: Has SOB, no cough  GI: No nausea, vomiting, abdominal pain  : No dysuria  Skin: no rash  MSK: Has leg swelling.  Psych: no depression  Endocrine: no polyuria, polydipsia    ALL OTHER SYSTEMS REVIEWED AND NEGATIVE    UNABLE TO OBTAIN    PHYSICAL EXAM:  VITALS: T(C): 37.1 (06-24-19 @ 05:06)  T(F): 98.8 (06-24-19 @ 05:06), Max: 98.8 (06-24-19 @ 05:06)  HR: 91 (06-24-19 @ 05:06) (69 - 91)  BP: 118/64 (06-24-19 @ 05:06) (114/65 - 143/81)  RR:  (17 - 17)  SpO2:  (96% - 100%)  Wt(kg): --  GENERAL: NAD, well-groomed, well-developed  EYES: No proptosis, no lid lag  HEENT:  Atraumatic, Normocephalic  THYROID: Normal size, no palpable nodules  RESPIRATORY: Clear to auscultation bilaterally; No rales, rhonchi, wheezing  CARDIOVASCULAR: Si S2, No murmurs;  GI: Soft, non distended, normal bowel sounds  SKIN: Dry, intact, No rashes or lesions  MUSCULOSKELETAL: Has BL lower extremity edema.  NEURO:  no tremor, sensation decreased in feet BL,                              11.5   12.71 )-----------( 247      ( 23 Jun 2019 05:40 )             36.3       06-23    144  |  110<H>  |  15  ----------------------------<  83  3.5   |  22  |  1.00    EGFR if : 81  EGFR if non : 70    Ca    8.9      06-23  Mg     2.1     06-22    TPro  7.6  /  Alb  4.0  /  TBili  0.5  /  DBili  x   /  AST  52<H>  /  ALT  38  /  AlkPhos  79  06-21      Thyroid Function Tests:  06-23 @ 05:20 TSH -- FreeT4 1.04 T3 77.1 Anti TPO -- Anti Thyroglobulin Ab -- TSI --  06-22 @ 09:30 TSH 4.67 FreeT4 -- T3 -- Anti TPO -- Anti Thyroglobulin Ab -- TSI --      Hemoglobin A1C, Whole Blood: 5.9 % <H> [4.0 - 5.6] (06-22-19 @ 09:30)      06-22 Chol 104<L> LDL 48 HDL 53 Trig 46    Radiology:

## 2019-06-24 NOTE — PROGRESS NOTE ADULT - PROBLEM SELECTOR PLAN 1
improved  2/2 multifocal b/l bacterial pneumonia  iv abx  PT  -> Plan to transition to oral tmrw and discharge

## 2019-06-25 ENCOUNTER — TRANSCRIPTION ENCOUNTER (OUTPATIENT)
Age: 84
End: 2019-06-25

## 2019-06-25 VITALS
SYSTOLIC BLOOD PRESSURE: 122 MMHG | OXYGEN SATURATION: 100 % | HEART RATE: 79 BPM | TEMPERATURE: 98 F | RESPIRATION RATE: 18 BRPM | DIASTOLIC BLOOD PRESSURE: 65 MMHG

## 2019-06-25 LAB
ANION GAP SERPL CALC-SCNC: 11 MMO/L — SIGNIFICANT CHANGE UP (ref 7–14)
APPEARANCE UR: CLEAR — SIGNIFICANT CHANGE UP
BACTERIA # UR AUTO: NEGATIVE — SIGNIFICANT CHANGE UP
BILIRUB UR-MCNC: NEGATIVE — SIGNIFICANT CHANGE UP
BLOOD UR QL VISUAL: NEGATIVE — SIGNIFICANT CHANGE UP
BUN SERPL-MCNC: 10 MG/DL — SIGNIFICANT CHANGE UP (ref 7–23)
CALCIUM SERPL-MCNC: 9.2 MG/DL — SIGNIFICANT CHANGE UP (ref 8.4–10.5)
CHLORIDE SERPL-SCNC: 109 MMOL/L — HIGH (ref 98–107)
CO2 SERPL-SCNC: 21 MMOL/L — LOW (ref 22–31)
COLOR SPEC: SIGNIFICANT CHANGE UP
CREAT ?TM UR-MCNC: 127.7 MG/DL — SIGNIFICANT CHANGE UP
CREAT SERPL-MCNC: 0.82 MG/DL — SIGNIFICANT CHANGE UP (ref 0.5–1.3)
GLUCOSE SERPL-MCNC: 94 MG/DL — SIGNIFICANT CHANGE UP (ref 70–99)
GLUCOSE UR-MCNC: NEGATIVE — SIGNIFICANT CHANGE UP
HYALINE CASTS # UR AUTO: SIGNIFICANT CHANGE UP
KETONES UR-MCNC: NEGATIVE — SIGNIFICANT CHANGE UP
LEUKOCYTE ESTERASE UR-ACNC: NEGATIVE — SIGNIFICANT CHANGE UP
NITRITE UR-MCNC: NEGATIVE — SIGNIFICANT CHANGE UP
PH UR: 6 — SIGNIFICANT CHANGE UP (ref 5–8)
POTASSIUM SERPL-MCNC: 3.6 MMOL/L — SIGNIFICANT CHANGE UP (ref 3.5–5.3)
POTASSIUM SERPL-SCNC: 3.6 MMOL/L — SIGNIFICANT CHANGE UP (ref 3.5–5.3)
PROT UR-MCNC: 70 — SIGNIFICANT CHANGE UP
PROT UR-MCNC: 80.7 MG/DL — SIGNIFICANT CHANGE UP
RBC CASTS # UR COMP ASSIST: SIGNIFICANT CHANGE UP (ref 0–?)
SODIUM SERPL-SCNC: 141 MMOL/L — SIGNIFICANT CHANGE UP (ref 135–145)
SP GR SPEC: 1.02 — SIGNIFICANT CHANGE UP (ref 1–1.04)
SQUAMOUS # UR AUTO: SIGNIFICANT CHANGE UP
UROBILINOGEN FLD QL: NORMAL — SIGNIFICANT CHANGE UP
WBC UR QL: SIGNIFICANT CHANGE UP (ref 0–?)

## 2019-06-25 PROCEDURE — 76770 US EXAM ABDO BACK WALL COMP: CPT | Mod: 26

## 2019-06-25 RX ORDER — CEFUROXIME AXETIL 250 MG
1 TABLET ORAL
Qty: 10 | Refills: 0
Start: 2019-06-25 | End: 2019-06-29

## 2019-06-25 RX ORDER — CEFUROXIME AXETIL 250 MG
500 TABLET ORAL EVERY 12 HOURS
Refills: 0 | Status: DISCONTINUED | OUTPATIENT
Start: 2019-06-25 | End: 2019-06-25

## 2019-06-25 RX ORDER — LEVOTHYROXINE SODIUM 125 MCG
1 TABLET ORAL
Qty: 30 | Refills: 0
Start: 2019-06-25 | End: 2019-07-24

## 2019-06-25 RX ADMIN — Medication 500 MILLIGRAM(S): at 05:28

## 2019-06-25 RX ADMIN — Medication 25 MICROGRAM(S): at 05:28

## 2019-06-25 RX ADMIN — Medication 500 MILLIGRAM(S): at 17:07

## 2019-06-25 RX ADMIN — Medication 3 MILLILITER(S): at 10:01

## 2019-06-25 NOTE — PHYSICAL THERAPY INITIAL EVALUATION ADULT - PATIENT PROFILE REVIEW, REHAB EVAL
yes/PT orders received: no formal activity order. Consult with CRISTAL BRITTON, pt may participate in PT evaluation and ambulate with PT.

## 2019-06-25 NOTE — DISCHARGE NOTE PROVIDER - NSDCCPCAREPLAN_GEN_ALL_CORE_FT
PRINCIPAL DISCHARGE DIAGNOSIS  Diagnosis: PNA (pneumonia)  Assessment and Plan of Treatment: You will need to continue taking the Ceftin till 6/29/2019 to complete a 7 day course of antibiotics.      SECONDARY DISCHARGE DIAGNOSES  Diagnosis: Hypothyroidism  Assessment and Plan of Treatment: You were started on Synthroid for hypothyroidism. You will need to follow up with your PCP for repeat thyroid function tests in 4 weeks.    Diagnosis: Syncope and collapse  Assessment and Plan of Treatment: Please follow up with your PCP within one week of discharge. PRINCIPAL DISCHARGE DIAGNOSIS  Diagnosis: PNA (pneumonia)  Assessment and Plan of Treatment: You will need to continue taking the Ceftin till 6/29/2019 to complete a 7 day course of antibiotics.      SECONDARY DISCHARGE DIAGNOSES  Diagnosis: Hypothyroidism  Assessment and Plan of Treatment: You were started on Synthroid for hypothyroidism. You will need to follow up with your PCP for repeat thyroid function tests in 4 weeks.    Diagnosis: Syncope and collapse  Assessment and Plan of Treatment: You have a follow up appointment on Tuesday 7/9/19 at 12:30 with Dr. KALYAN Hernandez 7

## 2019-06-25 NOTE — PHYSICAL THERAPY INITIAL EVALUATION ADULT - PHYSICAL ASSIST/NONPHYSICAL ASSIST, REHAB EVAL
Is This A New Presentation, Or A Follow-Up?: Phototherapy Treatment
nonverbal cues (demo/gestures)/supervision/verbal cues

## 2019-06-25 NOTE — PROGRESS NOTE ADULT - ATTENDING COMMENTS
Patient seen and examined, agree with above assessment and plan as transcribed above.    - D/C planning per med  - Outpt cardiac f/u    Gideon Moore MD, [facility]  BEEPER [phone]

## 2019-06-25 NOTE — PHYSICAL THERAPY INITIAL EVALUATION ADULT - ADDITIONAL COMMENTS
Patient lives with his wife in a private house, per wife no steps to negotiate. Patient reports he was previously independent in all ADLs and did not use an assistive device for ambulation.     Patient was left semi-supine in bed as found, all lines/tubes intact and call palafox within reach, CRISTAL espinoza

## 2019-06-25 NOTE — PROGRESS NOTE ADULT - SUBJECTIVE AND OBJECTIVE BOX
Infectious Diseases progress note:    Subjective: NAD, afebrile.  Feeling well.  No cough/abd pain/cp/diarrhea/dysuria.      ROS:  CONSTITUTIONAL:  No fever, chills, rigors  CARDIOVASCULAR:  No chest pain or palpitations  RESPIRATORY:   No SOB, cough, dyspnea on exertion.  No wheezing  GASTROINTESTINAL:  No abd pain, N/V, diarrhea/constipation  EXTREMITIES:  No swelling or joint pain  GENITOURINARY:  No burning on urination, increased frequency or urgency.  No flank pain  NEUROLOGIC:  No HA, visual disturbances  SKIN: No rashes    Allergies    No Known Allergies    Intolerances        ANTIBIOTICS/RELEVANT:  antimicrobials  cefuroxime   Tablet 500 milliGRAM(s) Oral every 12 hours    immunologic:    OTHER:  acetaminophen   Tablet .. 650 milliGRAM(s) Oral every 6 hours PRN  ALBUTerol/ipratropium for Nebulization 3 milliLiter(s) Nebulizer every 6 hours  donepezil 5 milliGRAM(s) Oral at bedtime  enoxaparin Injectable 40 milliGRAM(s) SubCutaneous every 24 hours  guaiFENesin   Syrup  (Sugar-Free) 200 milliGRAM(s) Oral every 6 hours PRN  levothyroxine 25 MICROGram(s) Oral daily  melatonin 3 milliGRAM(s) Oral at bedtime PRN  tamsulosin 0.4 milliGRAM(s) Oral at bedtime      Objective:  Vital Signs Last 24 Hrs  T(C): 36.8 (2019 12:00), Max: 37 (2019 09:43)  T(F): 98.2 (2019 12:00), Max: 98.6 (2019 09:43)  HR: 79 (2019 12:00) (70 - 90)  BP: 122/65 (2019 12:00) (118/58 - 131/69)  BP(mean): --  RR: 18 (2019 12:00) (17 - 18)  SpO2: 100% (2019 12:00) (97% - 100%)    PHYSICAL EXAM:  Constitutional:NAD  Eyes:DORA, EOMI  Ear/Nose/Throat: no thrush, mucositis.  Moist mucous membranes	  Neck:no JVD, no lymphadenopathy, supple  Respiratory: CTA rashmi  Cardiovascular: S1S2 RRR, no murmurs  Gastrointestinal:soft, nontender,  nondistended (+) BS  Extremities:no e/e/c  Skin:  no rashes, open wounds or ulcerations        LABS:                        11.0   8.32  )-----------( 218      ( 2019 06:30 )             34.4         141  |  109<H>  |  10  ----------------------------<  94  3.6   |  21<L>  |  0.82    Ca    9.2      2019 06:00  Mg     1.9             Urinalysis Basic - ( 2019 00:40 )    Color: LIGHT YELLOW / Appearance: CLEAR / S.017 / pH: 6.0  Gluc: NEGATIVE / Ketone: NEGATIVE  / Bili: NEGATIVE / Urobili: NORMAL   Blood: NEGATIVE / Protein: 70 / Nitrite: NEGATIVE   Leuk Esterase: NEGATIVE / RBC: 0-2 / WBC 3-5   Sq Epi: OCC / Non Sq Epi: x / Bacteria: NEGATIVE                          MICROBIOLOGY:    Culture - Blood (19 @ 02:31)    Culture - Blood:   NO ORGANISMS ISOLATED  NO ORGANISMS ISOLATED AT 72 HRS.    Specimen Source: BLOOD VENOUS    Culture - Blood (19 @ 02:31)    Culture - Blood:   NO ORGANISMS ISOLATED  NO ORGANISMS ISOLATED AT 72 HRS.    Specimen Source: BLOOD PERIPHERAL    Culture - Urine (19 @ 22:57)    Culture - Urine:   NO GROWTH AT 24 HOURS    Specimen Source: URINE MIDSTREAM          RADIOLOGY & ADDITIONAL STUDIES:    < from: US Kidney and Bladder (19 @ 09:13) >  FINDINGS:    Right kidney:  9 cm. Norenal mass, hydronephrosis or calculi.    Left kidney:  10 cm. No renal mass, hydronephrosis or calculi.    Urinary bladder: Within normal limits. The prostate is markedly enlarged   measuring 9 x 7 x 7 cm.    IMPRESSION:     No hydronephrosis.    Marked prostate enlargement.    < end of copied text >        < from: CT Head No Cont (19 @ 10:13) >  FINDINGS:   No acute intracranial hemorrhage, mass effect, or midline shift. No   abnormal extra-axial collections. The basal cisterns are patent without   evidence of central herniation. Bilateral physiologic basal ganglia   calcifications. The sulci and ventricles are within normal limits for the   patient's age. Mild patchy periventricular white matter hypoattenuation,   likely the sequela of chronic microvascular ischemic disease.    The calvarium is intact. The softtissues of the scalp are unremarkable.   The visualized paranasal sinuses and tympanomastoid cavities are clear.      IMPRESSION:     No CT evidence of acute intracranial hemorrhage, mass effect, or midline   shift.     < end of copied text >
SUBJECTIVE: no CP or SOB.       acetaminophen   Tablet .. 650 milliGRAM(s) Oral every 6 hours PRN  ALBUTerol/ipratropium for Nebulization 3 milliLiter(s) Nebulizer every 6 hours  cefuroxime   Tablet 500 milliGRAM(s) Oral every 12 hours  donepezil 5 milliGRAM(s) Oral at bedtime  enoxaparin Injectable 40 milliGRAM(s) SubCutaneous every 24 hours  guaiFENesin   Syrup  (Sugar-Free) 200 milliGRAM(s) Oral every 6 hours PRN  levothyroxine 25 MICROGram(s) Oral daily  melatonin 3 milliGRAM(s) Oral at bedtime PRN  tamsulosin 0.4 milliGRAM(s) Oral at bedtime                      11.0   8.32  )-----------( 218      ( 24 Jun 2019 06:30 )             34.4     Hemoglobin: 11.0 g/dL (06-24 @ 06:30)  Hemoglobin: 11.5 g/dL (06-23 @ 05:40)  Hemoglobin: 11.9 g/dL (06-21 @ 21:41)    06-25    141  |  109<H>  |  10  ----------------------------<  94  3.6   |  21<L>  |  0.82    Ca    9.2      25 Jun 2019 06:00  Mg     1.9     06-24      Creatinine Trend: 0.82<--, 0.85<--, 1.00<--, 0.89<--, 1.02<--    T(C): 37 (06-25-19 @ 09:43), Max: 37 (06-25-19 @ 09:43)  HR: 86 (06-25-19 @ 10:02) (67 - 92)  BP: 131/69 (06-25-19 @ 09:43) (118/58 - 131/69)  RR: 18 (06-25-19 @ 09:43) (17 - 18)  SpO2: 97% (06-25-19 @ 10:02) (95% - 100%)  Wt(kg): --    I&O's Summary    24 Jun 2019 07:01  -  25 Jun 2019 07:00  --------------------------------------------------------  IN: 0 mL / OUT: 800 mL / NET: -800 mL      Cardiovascular:  S1S2 RRR, No JVD  Respiratory: decreased BS BL  Gastrointestinal: Abdomen soft, ND, NT, +BS  Skin: Warm, dry, intact. No rash.  Ext: No C/C/E B/L LE    DIAGNOSTIC DATA  TELEMETRY:   SR  90, no events overnight     EKG: NSR, AL 162ms, QRS 88ms, QTc 400ms    < from: CT Chest No Cont (06.23.19 @ 10:11) >  IMPRESSION:     Predominantly left lower lobe pneumonia. Patchy ground glass opacities in   the right middle and upper lobe may represent multifocal spread of   disease.    < end of copied text >    < from: CT Head No Cont (06.23.19 @ 10:13) >    IMPRESSION:     No CT evidence of acute intracranial hemorrhage, mass effect, or midline   shift.     < end of copied text >    < from: Transthoracic Echocardiogram (06.24.19 @ 13:50) >  CONCLUSIONS:  1. Mitral annular calcification, otherwise normal mitral  valve. Minimal mitral regurgitation.  2. Normal left ventricular internal dimensions and wall  thicknesses.  3. Normal left ventricular systolic function. No segmental  wall motion abnormalities.  4. Normal right ventricular size and function.  ------------------------------------------------------------------------  Confirmed on  6/24/2019 - 15:43:21 by Kj Gonzalez M.D.  ------------------------------------------------------------------------    < end of copied text >      ASSESSMENT AND PLAN:  82 y/o Uzbek speaking male (understands some English), translation obtained from his daughter and grandchildren, with a PMHx no significant cardiac hx, or know CAD, Dementia, BPH s/p TURP, presented to the [facility] ED with syncope.     --ACS ruled out with serial CE  --Echo as noted above, normal LV function  --orthostatics pending   --EKG with NSR, Normal intervals  --CT chest with ?multifocal PNA, d/c on abx x5 days per primary team   --d/c planning per primary team   -- spoke to pts son, he would like pt to follow in our office; appt scheduled for Tuesday 7/9/19 at 12:30 with Dr. KALYAN Hernandez
Chief complaint  Patient is a 83y old  Male who presents with a chief complaint of Syncope (25 Jun 2019 11:21)   Review of systems  Patient in bed, looks comfortable, no fever, no hypoglycemia.    Labs and Fingersticks  CAPILLARY BLOOD GLUCOSE          Anion Gap, Serum: 11 (06-25 @ 06:00)  Anion Gap, Serum: 13 (06-24 @ 06:30)      Calcium, Total Serum: 9.2 (06-25 @ 06:00)  Calcium, Total Serum: 9.1 (06-24 @ 06:30)          06-25    141  |  109<H>  |  10  ----------------------------<  94  3.6   |  21<L>  |  0.82    Ca    9.2      25 Jun 2019 06:00  Mg     1.9     06-24                          11.0   8.32  )-----------( 218      ( 24 Jun 2019 06:30 )             34.4     Medications  MEDICATIONS  (STANDING):  ALBUTerol/ipratropium for Nebulization 3 milliLiter(s) Nebulizer every 6 hours  cefuroxime   Tablet 500 milliGRAM(s) Oral every 12 hours  donepezil 5 milliGRAM(s) Oral at bedtime  enoxaparin Injectable 40 milliGRAM(s) SubCutaneous every 24 hours  levothyroxine 25 MICROGram(s) Oral daily  tamsulosin 0.4 milliGRAM(s) Oral at bedtime      Physical Exam  General: Patient comfortable in bed  Vital Signs Last 12 Hrs  T(F): 98.6 (06-25-19 @ 09:43), Max: 98.6 (06-25-19 @ 09:43)  HR: 86 (06-25-19 @ 10:02) (70 - 88)  BP: 131/69 (06-25-19 @ 09:43) (118/58 - 131/69)  BP(mean): --  RR: 18 (06-25-19 @ 09:43) (17 - 18)  SpO2: 97% (06-25-19 @ 10:02) (97% - 100%)  Neck: No palpable thyroid nodules.  CVS: S1S2, No murmurs  Respiratory: No wheezing, no crepitations  GI: Abdomen soft, bowel sounds positive  Musculoskeletal:  edema lower extremities.   Skin: No skin rashes, no ecchymosis    Diagnostics
Infectious Diseases progress note:    Subjective: Events noted.  No new fevers, leukocytosis resolved.  Pt for echo today.     ROS:  CONSTITUTIONAL:  No fever, chills, rigors  CARDIOVASCULAR:  No chest pain or palpitations  RESPIRATORY:   No SOB, cough, dyspnea on exertion.  No wheezing  GASTROINTESTINAL:  No abd pain, N/V, diarrhea/constipation  EXTREMITIES:  No swelling or joint pain  GENITOURINARY:  No burning on urination, increased frequency or urgency.  No flank pain  NEUROLOGIC:  No HA, visual disturbances  SKIN: No rashes    Allergies    No Known Allergies    Intolerances        ANTIBIOTICS/RELEVANT:  antimicrobials  azithromycin  IVPB 500 milliGRAM(s) IV Intermittent every 24 hours  cefTRIAXone   IVPB 1000 milliGRAM(s) IV Intermittent every 24 hours    immunologic:    OTHER:  acetaminophen   Tablet .. 650 milliGRAM(s) Oral every 6 hours PRN  ALBUTerol/ipratropium for Nebulization 3 milliLiter(s) Nebulizer every 6 hours  donepezil 5 milliGRAM(s) Oral at bedtime  enoxaparin Injectable 40 milliGRAM(s) SubCutaneous every 24 hours  guaiFENesin   Syrup  (Sugar-Free) 200 milliGRAM(s) Oral every 6 hours PRN  levothyroxine 25 MICROGram(s) Oral daily  melatonin 3 milliGRAM(s) Oral at bedtime PRN  tamsulosin 0.4 milliGRAM(s) Oral at bedtime      Objective:  Vital Signs Last 24 Hrs  T(C): 36.4 (24 Jun 2019 16:59), Max: 37.1 (24 Jun 2019 05:06)  T(F): 97.5 (24 Jun 2019 16:59), Max: 98.8 (24 Jun 2019 05:06)  HR: 92 (24 Jun 2019 16:59) (67 - 92)  BP: 121/66 (24 Jun 2019 16:59) (118/64 - 143/81)  BP(mean): --  RR: 18 (24 Jun 2019 16:59) (17 - 18)  SpO2: 95% (24 Jun 2019 16:59) (95% - 100%)    PHYSICAL EXAM:  Constitutional:NAD  Eyes:DORA, EOMI  Ear/Nose/Throat: no thrush, mucositis.  Moist mucous membranes	  Neck:no JVD, no lymphadenopathy, supple  Respiratory: CTA rashmi  Cardiovascular: S1S2 RRR, no murmurs  Gastrointestinal:soft, nontender,  nondistended (+) BS  Extremities:no e/e/c  Skin:  no rashes, open wounds or ulcerations        LABS:                        11.0   8.32  )-----------( 218      ( 24 Jun 2019 06:30 )             34.4     06-24    140  |  105  |  11  ----------------------------<  121<H>  3.3<L>   |  22  |  0.85    Ca    9.1      24 Jun 2019 06:30  Mg     1.9     06-24                              MICROBIOLOGY:    Culture - Blood (06.22.19 @ 02:31)    Culture - Blood:   NO ORGANISMS ISOLATED  NO ORGANISMS ISOLATED AT 48 HRS.    Specimen Source: BLOOD VENOUS    Culture - Blood (06.22.19 @ 02:31)    Culture - Blood:   NO ORGANISMS ISOLATED  NO ORGANISMS ISOLATED AT 48 HRS.    Specimen Source: BLOOD PERIPHERAL    Culture - Urine (06.21.19 @ 22:57)    Culture - Urine:   NO GROWTH AT 24 HOURS    Specimen Source: URINE MIDSTREAM    Legionella pneumophila Antigen, Urine: Negative (06.22.19 @ 12:40)          RADIOLOGY & ADDITIONAL STUDIES:
[facility]   MD EDWAR SHIELDS MD ANGELA WONG, PA    TEL:  OFFICE: [phone]  DR VALENCIA CELL: [phone]  DR. HARDING CELL: [phone]  MAXIMILIANO FUNEZ CELL: [phone]        Patient is a 83y old  Male who presents with a chief complaint of Syncope (25 Jun 2019 11:00)      Patient seen and examined at bedside. No chest pain/sob    VITALS:  T(F): 98.6 (06-25-19 @ 09:43), Max: 98.6 (06-25-19 @ 09:43)  HR: 86 (06-25-19 @ 10:02)  BP: 131/69 (06-25-19 @ 09:43)  RR: 18 (06-25-19 @ 09:43)  SpO2: 97% (06-25-19 @ 10:02)  Wt(kg): --    06-24 @ 07:01  -  06-25 @ 07:00  --------------------------------------------------------  IN: 0 mL / OUT: 800 mL / NET: -800 mL          PHYSICAL EXAM:  Constitutional: NAD  Neck: No JVD  Respiratory: CTAB, no wheezes, rales or rhonchi  Cardiovascular: S1, S2, RRR  Gastrointestinal: BS+, soft, NT/ND  Extremities: No peripheral edema    Hospital Medications:   MEDICATIONS  (STANDING):  ALBUTerol/ipratropium for Nebulization 3 milliLiter(s) Nebulizer every 6 hours  cefuroxime   Tablet 500 milliGRAM(s) Oral every 12 hours  donepezil 5 milliGRAM(s) Oral at bedtime  enoxaparin Injectable 40 milliGRAM(s) SubCutaneous every 24 hours  levothyroxine 25 MICROGram(s) Oral daily  tamsulosin 0.4 milliGRAM(s) Oral at bedtime      LABS:  06-25    141  |  109<H>  |  10  ----------------------------<  94  3.6   |  21<L>  |  0.82    Ca    9.2      25 Jun 2019 06:00  Mg     1.9     06-24      Creatinine Trend: 0.82 <--, 0.85 <--, 1.00 <--, 0.89 <--, 1.02 <--                                11.0   8.32  )-----------( 218      ( 24 Jun 2019 06:30 )             34.4     Urine Studies:  Urinalysis - [06-25-19 @ 00:40]      Color LIGHT YELLOW / Appearance CLEAR / SG 1.017 / pH 6.0      Gluc NEGATIVE / Ketone NEGATIVE  / Bili NEGATIVE / Urobili NORMAL       Blood NEGATIVE / Protein 70 / Leuk Est NEGATIVE / Nitrite NEGATIVE      RBC 0-2 / WBC 3-5 / Hyaline 1+ / Gran  / Sq Epi OCC / Non Sq Epi  / Bacteria NEGATIVE    Urine Creatinine 127.70      [06-25-19 @ 00:40]  Urine Protein 80.7      [06-25-19 @ 00:40]    HbA1c 5.9      [06-22-19 @ 09:30]  TSH 4.67      [06-22-19 @ 09:30]  Lipid: chol 104, TG 46, HDL 53, LDL 48      [06-22-19 @ 09:30]        RADIOLOGY & ADDITIONAL STUDIES:
Patient seen and examined at bedside  No acute events noted overnight  Case discussed with medical team    HPI:  82 y/o Tristanian speaking male (understands some English), with a PmHx of Dementia, BPH s/p TURP, presented to the [facility] ED with syncope. Pt is very confused and son at bedside was able to translate and provide information. Son states his father normally stays with him in [place] but was staying with his sister in [place] the last two nights because for the past few days his was c/o fever and a cough and they planning on taking him to his PCP in [place] this coming Monday. Two nights ago son states he was taking Nyquil to help with the fever, coughing and help him sleep. Last night, he was sitting at the table watching television and his daughter's house when his daughter called his name and he wasn't answering. As per pts son, his sister had stated after he wasn't responding to her (she was sitting across the room from him), she had gone to check up on him and he was unresponsive for about 2-3 minutes so she called 911.   As per son, pt denied any chest pain, sob, HA, dizziness, blurred vision, n/v, abd pain. Neg recent travel or sick contacts. Only complaint was coughing, decreased appetitie and fever over the past two days. In the [facility] ED, he was found to have a WBC of 20.84 and a CXR that was showing pneumonia. He was give Azithromycin and Ceftriaxone. He appears comfortable at this time and is being admitted to telemetry for syncope r/o acs and for Pneumonia.  =>Pt has advanced Dementia and was getting agitated in the ED. He was placed on a 1:1 for safety and then family showed up and he calmed down. (2019 07:58)      PAST MEDICAL & SURGICAL HISTORY:  Dementia  Enlarged prostate  S/P TURP (transurethral resection of prostate)      No Known Allergies       MEDICATIONS  (STANDING):  ALBUTerol/ipratropium for Nebulization 3 milliLiter(s) Nebulizer every 6 hours  cefuroxime   Tablet 500 milliGRAM(s) Oral every 12 hours  donepezil 5 milliGRAM(s) Oral at bedtime  enoxaparin Injectable 40 milliGRAM(s) SubCutaneous every 24 hours  levothyroxine 25 MICROGram(s) Oral daily  tamsulosin 0.4 milliGRAM(s) Oral at bedtime    MEDICATIONS  (PRN):  acetaminophen   Tablet .. 650 milliGRAM(s) Oral every 6 hours PRN Temp greater or equal to 38C (100.4F), Mild Pain (1 - 3), Moderate Pain (4 - 6)  guaiFENesin   Syrup  (Sugar-Free) 200 milliGRAM(s) Oral every 6 hours PRN Cough  melatonin 3 milliGRAM(s) Oral at bedtime PRN Insomnia      REVIEW OF SYSTEMS:  CONSTITUTIONAL: (+) malaise.   EYES: No acute change in vision   ENT:  No tinnitus  NECK: No stiffness  RESPIRATORY: No hemoptysis  CARDIOVASCULAR: No chest pain, palpitations, syncope  GASTROINTESTINAL: No hematemesis, diarrhea, melena, or hematochezia.  GENITOURINARY: No hematuria  NEUROLOGICAL: No headaches  LYMPH Nodes: No enlarged glands  ENDOCRINE: No heat or cold intolerance	    T(C): 37 (19 @ 09:43), Max: 37 (19 @ 09:43)  HR: 80 (19 @ 09:43) (67 - 92)  BP: 131/69 (19 @ 09:43) (118/58 - 131/69)  RR: 18 (19 @ 09:43) (17 - 18)  SpO2: 100% (19 @ 09:43) (95% - 100%)    PHYSICAL EXAMINATION:   Constitutional: WD, NAD  HEENT: NC, AT  Neck:  Supple  Respiratory:  Adequate airflow b/l. Not using accessory muscles of respiration.  Cardiovascular:  S1 & S2 intact, no R/G, 2+ radial pulses b/l  Gastrointestinal: Soft, NT, ND, normoactive b.s., no organomegaly/RT/rigidity  Extremities: WWP  Neurological:  Alert and awake.  No acute focal motor deficits. Crude sensation intact.     Labs and imaging reviewed    LABS:                        11.0   8.32  )-----------( 218      ( 2019 06:30 )             34.4         141  |  109<H>  |  10  ----------------------------<  94  3.6   |  21<L>  |  0.82    Ca    9.2      2019 06:00  Mg     1.9     06-24            Urinalysis Basic - ( 2019 00:40 )    Color: LIGHT YELLOW / Appearance: CLEAR / S.017 / pH: 6.0  Gluc: NEGATIVE / Ketone: NEGATIVE  / Bili: NEGATIVE / Urobili: NORMAL   Blood: NEGATIVE / Protein: 70 / Nitrite: NEGATIVE   Leuk Esterase: NEGATIVE / RBC: 0-2 / WBC 3-5   Sq Epi: OCC / Non Sq Epi: x / Bacteria: NEGATIVE      CAPILLARY BLOOD GLUCOSE                  RADIOLOGY & ADDITIONAL STUDIES:
Patient seen and examined at bedside  No new acute events noted overnight  Case discussed with medical team    HPI:  84 y/o Barbadian speaking male (understands some English), with a PmHx of Dementia, BPH s/p TURP, presented to the [facility] ED with syncope. Pt is very confused and son at bedside was able to translate and provide information. Son states his father normally stays with him in [place] but was staying with his sister in [place] the last two nights because for the past few days his was c/o fever and a cough and they planning on taking him to his PCP in [place] this coming Monday. Two nights ago son states he was taking Nyquil to help with the fever, coughing and help him sleep. Last night, he was sitting at the table watching television and his daughter's house when his daughter called his name and he wasn't answering. As per pts son, his sister had stated after he wasn't responding to her (she was sitting across the room from him), she had gone to check up on him and he was unresponsive for about 2-3 minutes so she called 911.   As per son, pt denied any chest pain, sob, HA, dizziness, blurred vision, n/v, abd pain. Neg recent travel or sick contacts. Only complaint was coughing, decreased appetitie and fever over the past two days. In the [facility] ED, he was found to have a WBC of 20.84 and a CXR that was showing pneumonia. He was give Azithromycin and Ceftriaxone. He appears comfortable at this time and is being admitted to telemetry for syncope r/o acs and for Pneumonia.  =>Pt has advanced Dementia and was getting agitated in the ED. He was placed on a 1:1 for safety and then family showed up and he calmed down. (2019 07:58)      PAST MEDICAL & SURGICAL HISTORY:  Dementia  Enlarged prostate  S/P TURP (transurethral resection of prostate)      No Known Allergies       MEDICATIONS  (STANDING):  ALBUTerol/ipratropium for Nebulization 3 milliLiter(s) Nebulizer every 6 hours  azithromycin  IVPB 500 milliGRAM(s) IV Intermittent every 24 hours  cefTRIAXone   IVPB 1000 milliGRAM(s) IV Intermittent every 24 hours  donepezil 5 milliGRAM(s) Oral at bedtime  enoxaparin Injectable 40 milliGRAM(s) SubCutaneous every 24 hours  sodium chloride 0.9%. 1000 milliLiter(s) (75 mL/Hr) IV Continuous <Continuous>  tamsulosin 0.4 milliGRAM(s) Oral at bedtime    MEDICATIONS  (PRN):  acetaminophen   Tablet .. 650 milliGRAM(s) Oral every 6 hours PRN Temp greater or equal to 38C (100.4F), Mild Pain (1 - 3), Moderate Pain (4 - 6)  guaiFENesin   Syrup  (Sugar-Free) 200 milliGRAM(s) Oral every 6 hours PRN Cough      REVIEW OF SYSTEMS:  CONSTITUTIONAL: (+) malaise.   EYES: No acute change in vision   ENT:  No tinnitus  NECK: No stiffness  RESPIRATORY: cough/sob/bonilla. No hemoptysis  CARDIOVASCULAR: No chest pain, palpitations, syncope  GASTROINTESTINAL: No hematemesis, diarrhea, melena, or hematochezia.  GENITOURINARY: No hematuria  NEUROLOGICAL: No headaches  LYMPH Nodes: No enlarged glands  ENDOCRINE: No heat or cold intolerance	    T(C): 37.1 (19 @ 05:51), Max: 37.1 (19 @ 05:51)  HR: 88 (19 @ 05:51) (84 - 107)  BP: 128/68 (19 @ 05:51) (120/62 - 128/68)  RR: 18 (19 @ 05:51) (16 - 18)  SpO2: 95% (19 @ 05:51) (90% - 98%)    PHYSICAL EXAMINATION:   Constitutional: WD, NAD  HEENT: NC, AT  Neck:  Supple  Respiratory:  improving rhonchi. Adequate airflow b/l. Not using accessory muscles of respiration.  Cardiovascular:  S1 & S2 intact, no R/G, 2+ radial pulses b/l  Gastrointestinal: Soft, NT, ND, normoactive b.s., no organomegaly/RT/rigidity  Extremities: WWP  Neurological:  Alert and awake.  No acute focal motor deficits. Crude sensation intact.     Labs and imaging reviewed    LABS:                        11.5   12.71 )-----------( 247      ( 2019 05:40 )             36.3         144  |  110<H>  |  15  ----------------------------<  83  3.5   |  22  |  1.00    Ca    8.9      2019 05:20  Mg     2.1     -    TPro  7.6  /  Alb  4.0  /  TBili  0.5  /  DBili  x   /  AST  52<H>  /  ALT  38  /  AlkPhos  79  06-21        PT/INR - ( 2019 21:41 )   PT: 16.5 SEC;   INR: 1.47          PTT - ( 2019 21:41 )  PTT:28.7 SEC  Urinalysis Basic - ( 2019 22:43 )    Color: YELLOW / Appearance: CLEAR / S.036 / pH: 6.0  Gluc: NEGATIVE / Ketone: NEGATIVE  / Bili: NEGATIVE / Urobili: NORMAL   Blood: NEGATIVE / Protein: 200 / Nitrite: NEGATIVE   Leuk Esterase: NEGATIVE / RBC: 6-10 / WBC 6-10   Sq Epi: FEW / Non Sq Epi: x / Bacteria: FEW      CAPILLARY BLOOD GLUCOSE            LIVER FUNCTIONS - ( 2019 21:45 )  Alb: 4.0 g/dL / Pro: 7.6 g/dL / ALK PHOS: 79 u/L / ALT: 38 u/L / AST: 52 u/L / GGT: x               RADIOLOGY & ADDITIONAL STUDIES:
Patient seen and examined at bedside  with wife at bedside per pt's request  agitated overnight, improving bonilla/sob/cough  Case discussed with medical team    HPI:  84 y/o Bolivian speaking male (understands some English), with a PmHx of Dementia, BPH s/p TURP, presented to the [facility] ED with syncope. Pt is very confused and son at bedside was able to translate and provide information. Son states his father normally stays with him in [place] but was staying with his sister in [place] the last two nights because for the past few days his was c/o fever and a cough and they planning on taking him to his PCP in [place] this coming Monday. Two nights ago son states he was taking Nyquil to help with the fever, coughing and help him sleep. Last night, he was sitting at the table watching television and his daughter's house when his daughter called his name and he wasn't answering. As per pts son, his sister had stated after he wasn't responding to her (she was sitting across the room from him), she had gone to check up on him and he was unresponsive for about 2-3 minutes so she called 911.   As per son, pt denied any chest pain, sob, HA, dizziness, blurred vision, n/v, abd pain. Neg recent travel or sick contacts. Only complaint was coughing, decreased appetitie and fever over the past two days. In the [facility] ED, he was found to have a WBC of 20.84 and a CXR that was showing pneumonia. He was give Azithromycin and Ceftriaxone. He appears comfortable at this time and is being admitted to telemetry for syncope r/o acs and for Pneumonia.  =>Pt has advanced Dementia and was getting agitated in the ED. He was placed on a 1:1 for safety and then family showed up and he calmed down. (22 Jun 2019 07:58)      PAST MEDICAL & SURGICAL HISTORY:  Dementia  Enlarged prostate  S/P TURP (transurethral resection of prostate)      No Known Allergies       MEDICATIONS  (STANDING):  ALBUTerol/ipratropium for Nebulization 3 milliLiter(s) Nebulizer every 6 hours  azithromycin  IVPB 500 milliGRAM(s) IV Intermittent every 24 hours  cefTRIAXone   IVPB 1000 milliGRAM(s) IV Intermittent every 24 hours  donepezil 5 milliGRAM(s) Oral at bedtime  enoxaparin Injectable 40 milliGRAM(s) SubCutaneous every 24 hours  levothyroxine 25 MICROGram(s) Oral daily  potassium chloride   Powder 20 milliEquivalent(s) Oral every 4 hours  tamsulosin 0.4 milliGRAM(s) Oral at bedtime    MEDICATIONS  (PRN):  acetaminophen   Tablet .. 650 milliGRAM(s) Oral every 6 hours PRN Temp greater or equal to 38C (100.4F), Mild Pain (1 - 3), Moderate Pain (4 - 6)  guaiFENesin   Syrup  (Sugar-Free) 200 milliGRAM(s) Oral every 6 hours PRN Cough  melatonin 3 milliGRAM(s) Oral at bedtime PRN Insomnia      REVIEW OF SYSTEMS:  CONSTITUTIONAL: (+) malaise. agitated overnight, improving bonilla/sob/cough  EYES: No acute change in vision   ENT:  No tinnitus  NECK: No stiffness  RESPIRATORY: No hemoptysis  CARDIOVASCULAR: No chest pain, palpitations, syncope  GASTROINTESTINAL: No hematemesis, diarrhea, melena, or hematochezia.  GENITOURINARY: No hematuria  NEUROLOGICAL: No headaches  LYMPH Nodes: No enlarged glands  ENDOCRINE: No heat or cold intolerance	    T(C): 37.1 (06-24-19 @ 05:06), Max: 37.1 (06-24-19 @ 05:06)  HR: 91 (06-24-19 @ 05:06) (69 - 91)  BP: 118/64 (06-24-19 @ 05:06) (114/65 - 143/81)  RR: 17 (06-24-19 @ 05:06) (17 - 17)  SpO2: 100% (06-24-19 @ 05:06) (96% - 100%)    PHYSICAL EXAMINATION:   Constitutional: WD, NAD  HEENT: NC, AT  Neck:  Supple  Respiratory:  improving rhonchi. Adequate airflow b/l. Not using accessory muscles of respiration.  Cardiovascular:  S1 & S2 intact, no R/G, 2+ radial pulses b/l  Gastrointestinal: Soft, NT, ND, normoactive b.s., no organomegaly/RT/rigidity  Extremities: WWP  Neurological:  Alert and awake.  No acute focal motor deficits. Crude sensation intact.     Labs and imaging reviewed    LABS:                        11.0   8.32  )-----------( 218      ( 24 Jun 2019 06:30 )             34.4     06-24    140  |  105  |  11  ----------------------------<  121<H>  3.3<L>   |  22  |  0.85    Ca    9.1      24 Jun 2019 06:30  Mg     1.9     06-24              CAPILLARY BLOOD GLUCOSE                  RADIOLOGY & ADDITIONAL STUDIES:
SUBJECTIVE: no CP or SOB      MEDICATIONS  (STANDING):  ALBUTerol/ipratropium for Nebulization 3 milliLiter(s) Nebulizer every 6 hours  azithromycin  IVPB 500 milliGRAM(s) IV Intermittent every 24 hours  cefTRIAXone   IVPB 1000 milliGRAM(s) IV Intermittent every 24 hours  donepezil 5 milliGRAM(s) Oral at bedtime  enoxaparin Injectable 40 milliGRAM(s) SubCutaneous every 24 hours  sodium chloride 0.9%. 1000 milliLiter(s) (75 mL/Hr) IV Continuous <Continuous>  tamsulosin 0.4 milliGRAM(s) Oral at bedtime    MEDICATIONS  (PRN):  acetaminophen   Tablet .. 650 milliGRAM(s) Oral every 6 hours PRN Temp greater or equal to 38C (100.4F), Mild Pain (1 - 3), Moderate Pain (4 - 6)  guaiFENesin   Syrup  (Sugar-Free) 200 milliGRAM(s) Oral every 6 hours PRN Cough      LABS:                         11.5   12.71 )-----------( 247      ( 23 Jun 2019 05:40 )             36.3     144  |  110<H>  |  15  ----------------------------<  83  3.5   |  22  |  1.00    Ca    8.9      23 Jun 2019 05:20  Mg     2.1     06-22    TPro  7.6  /  Alb  4.0  /  TBili  0.5  /  DBili  x   /  AST  52<H>  /  ALT  38  /  AlkPhos  79  06-21    PHYSICAL EXAM:  Vital Signs Last 24 Hrs  T(C): 37.1 (23 Jun 2019 05:51), Max: 37.1 (23 Jun 2019 05:51)  T(F): 98.7 (23 Jun 2019 05:51), Max: 98.7 (23 Jun 2019 05:51)  HR: 69 (23 Jun 2019 10:52) (69 - 107)  BP: 128/68 (23 Jun 2019 05:51) (120/62 - 128/68)  RR: 18 (23 Jun 2019 05:51) (16 - 18)  SpO2: 99% (23 Jun 2019 10:52) (90% - 99%)    Cardiovascular:  S1S2 RRR, No JVD  Respiratory: coarse BS B/L  Gastrointestinal: Abdomen soft, ND, NT, +BS  Skin: Warm, dry, intact. No rash.  Musculoskeletal: Normal ROM, normal strength  Ext: No C/C/E B/L LE    DIAGNOSTIC DATA  TELEMETRY: SR    EKG: NSR, GA 162ms, QRS 88ms, QTc 400ms    < from: CT Chest No Cont (06.23.19 @ 10:11) >  IMPRESSION:     Predominantly left lower lobe pneumonia. Patchy ground glass opacities in   the right middle and upper lobe may represent multifocal spread of   disease.    < end of copied text >    < from: CT Head No Cont (06.23.19 @ 10:13) >    IMPRESSION:     No CT evidence of acute intracranial hemorrhage, mass effect, or midline   shift.     < end of copied text >      ASSESSMENT AND PLAN:  82 y/o Turkmen speaking male (understands some English), translation obtained from his daughter and grandchildren, with a PMHx no significant cardiac hx, or know CAD, Dementia, BPH s/p TURP, presented to the [facility] ED with syncope. Cardiology consulted for r/o ACS.    --ACS ruled out with serial CE  --Check TTE  --Check orthostatics  --EKG with NSR, Normal intervals  --CT chest with ?multifocal PNA.  On Abx per primary team
SUBJECTIVE: no CP or SOB    MEDICATIONS  (STANDING):  ALBUTerol/ipratropium for Nebulization 3 milliLiter(s) Nebulizer every 6 hours  azithromycin  IVPB 500 milliGRAM(s) IV Intermittent every 24 hours  cefTRIAXone   IVPB 1000 milliGRAM(s) IV Intermittent every 24 hours  donepezil 5 milliGRAM(s) Oral at bedtime  enoxaparin Injectable 40 milliGRAM(s) SubCutaneous every 24 hours  levothyroxine 25 MICROGram(s) Oral daily  potassium chloride   Powder 20 milliEquivalent(s) Oral every 4 hours  tamsulosin 0.4 milliGRAM(s) Oral at bedtime    LABS:                       11.0   8.32  )-----------( 218      ( 24 Jun 2019 06:30 )             34.4     140  |  105  |  11  ----------------------------<  121<H>  3.3<L>   |  22  |  0.85    Ca    9.1      24 Jun 2019 06:30  Mg     1.9     06-24        PHYSICAL EXAM:  Vital Signs Last 24 Hrs  T(C): 37.1 (24 Jun 2019 05:06), Max: 37.1 (24 Jun 2019 05:06)  T(F): 98.8 (24 Jun 2019 05:06), Max: 98.8 (24 Jun 2019 05:06)  HR: 84 (24 Jun 2019 10:53) (75 - 91)  BP: 118/64 (24 Jun 2019 05:06) (114/65 - 143/81)  RR: 17 (24 Jun 2019 05:06) (17 - 17)  SpO2: 100% (24 Jun 2019 05:06) (96% - 100%)    Cardiovascular:  S1S2 RRR, No JVD  Respiratory: decreased BS BL  Gastrointestinal: Abdomen soft, ND, NT, +BS  Skin: Warm, dry, intact. No rash.  Ext: No C/C/E B/L LE    DIAGNOSTIC DATA  TELEMETRY: SR/ST    EKG: NSR, AR 162ms, QRS 88ms, QTc 400ms    < from: CT Chest No Cont (06.23.19 @ 10:11) >  IMPRESSION:     Predominantly left lower lobe pneumonia. Patchy ground glass opacities in   the right middle and upper lobe may represent multifocal spread of   disease.    < end of copied text >    < from: CT Head No Cont (06.23.19 @ 10:13) >    IMPRESSION:     No CT evidence of acute intracranial hemorrhage, mass effect, or midline   shift.     < end of copied text >      ASSESSMENT AND PLAN:  84 y/o Tristanian speaking male (understands some English), translation obtained from his daughter and grandchildren, with a PMHx no significant cardiac hx, or know CAD, Dementia, BPH s/p TURP, presented to the [facility] ED with syncope.     --ACS ruled out with serial CE  --Check TTE  --Check orthostatics  --EKG with NSR, Normal intervals  --CT chest with ?multifocal PNA.  On Abx per primary team

## 2019-06-25 NOTE — DISCHARGE NOTE PROVIDER - HOSPITAL COURSE
84 y/o Singaporean speaking male (understands some English), with a PmHx of Dementia, BPH s/p TURP, presented to the [facility] ED with syncope.         Pt was admitted to telemetry unit where he was monitored on telemetry with no remarkable events. Echocardiogram unremarkable.         Pt with leukocytosis and fever at home, CT chest with predominantly LLL pna with multifocal spread, and pt was treated with azithromycin/ ceftriaxone for 4 days, remainder of course will be ceftin to complete a 7 day course. Pt is now medically stable for discharge home.

## 2019-06-25 NOTE — PROGRESS NOTE ADULT - PROVIDER SPECIALTY LIST ADULT
Cardiology
Cardiology
Endocrinology
Infectious Disease
Internal Medicine
Nephrology
Cardiology
Infectious Disease

## 2019-06-25 NOTE — PROGRESS NOTE ADULT - PROBLEM SELECTOR PLAN 1
improving 2/2 multifocal b/l bacterial pneumonia  -> F/u TTE.  If no acute abnormality requiring inpt hospitalization, then pt is medically cleared for safe discharge on po abx with outpt pcp f/u within 5 days

## 2019-06-25 NOTE — PHYSICAL THERAPY INITIAL EVALUATION ADULT - GAIT DEVIATIONS NOTED, PT EVAL
decreased maureen/decreased velocity of limb motion/decreased stride length/decreased weight-shifting ability/decreased step length/increased time in double stance

## 2019-06-25 NOTE — PHYSICAL THERAPY INITIAL EVALUATION ADULT - ASSISTIVE DEVICE FOR TRANSFER: GAIT, REHAB EVAL
rolling walker/rolling walker utilized for first half of gait assessment, however patient began lifting rolling walker or attempting to throw it out of the way despite verbal and tactile cues. Remaining 25 feet of gait assessment were performed without assistive device

## 2019-06-25 NOTE — DISCHARGE NOTE NURSING/CASE MANAGEMENT/SOCIAL WORK - NSDCDPATPORTLINK_GEN_ALL_CORE
You can access the "[employer] Patient Portal, offered by [facility], by registering with the following website: [email or web address]

## 2019-06-25 NOTE — DISCHARGE NOTE PROVIDER - CARE PROVIDER_API CALL
Renita Dhillon  Phone: [phone]  Fax: (   )    -  Follow Up Time: Renita Dhillon  Phone: [phone]  Fax: (   )    -  Follow Up Time:     Jorge Hernandez)  Cardiology Medicine  [address] [place] [address], [address]  [place] [address]  Phone: [phone]  Fax: [phone]  Follow Up Time:

## 2019-06-25 NOTE — PHYSICAL THERAPY INITIAL EVALUATION ADULT - PERTINENT HX OF CURRENT PROBLEM, REHAB EVAL
Patient is an 83 year old male admitted to [facility] on 6/22 s/p syncopal episode. PMH: Dementia, BPH s/p TURP.

## 2019-06-25 NOTE — DISCHARGE NOTE PROVIDER - PROVIDER TOKENS
FREE:[LAST:[Alejo],FIRST:[Renita],PHONE:[phone]],FAX:[(   )    -]] FREE:[LAST:[Alejo],FIRST:[Renita],PHONE:[phone]],FAX:[(   )    -]],PROVIDER:[TOKEN:[[id number]]]

## 2019-06-25 NOTE — PROGRESS NOTE ADULT - ASSESSMENT
82 y/o Macedonian speaking male (understands some English), with a PmHx of Dementia, BPH s/p TURP, presented to the [facility] ED with syncope. Pt is very confused and son at bedside was able to translate and provide information. Son states his father normally stays with him in [place] but was staying with his sister in [place] the last two nights because for the past few days his was c/o fever and a cough and they planning on taking him to his PCP in [place] this coming Monday.     Two nights ago son states he was taking Nyquil to help with the fever, coughing and help him sleep. Last night, he was sitting at the table watching television and his daughter's house when his daughter called his name and he wasn't answering. As per pts son, his sister had stated after he wasn't responding to her (she was sitting across the room from him), she had gone to check up on him and he was unresponsive for about 2-3 minutes so she called 911.     As per son, pt denied any chest pain, sob, HA, dizziness, blurred vision, n/v, abd pain. Neg recent travel or sick contacts. Only complaint was coughing, decreased appetitie and fever over the past two days. In the [facility] ED, he was found to have a WBC of 20.84 and a CXR that was showing pneumonia. He was give Azithromycin and Ceftriaxone. He appears comfortable at this time and is being admitted to telemetry for syncope r/o acs and for Pneumonia.  =>Pt has advanced Dementia and was getting agitated in the ED. He was placed on a 1:1 for safety and then family showed up and he calmed down. (22 Jun 2019 07:58)    ER vss.  Pt afebrile.  WBC 20.8 --> 12.7.  UA (-).  RVP (-).  Leg Ag (-).  Bcx (-) at 24 hrs.  Ucx (-).  CT chest with LLL pna and patchy GGOs in RML and RUL.       ID consult called for further abx management.       CAP:    - Pt with sepsis presentation (fever - at home, leukocytosis).  CT chest with predominantly LLL pna with multifocal spread.      - On rocephin.  Legionella Ag (-), can d/c azithromycin.     - Blood cultures x 2 ngtd.      - Leukocytosis resolved, no new fever.  Change to ceftin 500mg po bid to complete total of 7 days, when pt ready for discharge (end date: 6/29).    OK to d/c from ID standpoint    Leslie Beckman  [phone]
82 y/o Guatemalan speaking male (understands some English), with a PmHx of Dementia, BPH s/p TURP, presented to the [facility] ED with syncope. Admitted to telemetry for r/o acs and for Pneumonia.
82 y/o Liberian speaking male (understands some English), with a PmHx of Dementia, BPH s/p TURP, presented to the [facility] ED with syncope. + fever cough found to have PNA. BIBMES sec to unresponsiveness. nephrology consulted for proteinuria and hematuria    Proteinuria  ? etiology  urine cx neg  urine pc 0.6g, possible functional  d/w son at bedside to follow up with pmd outpatient     hematuria  hx of TURP  repeat UA neg for hematuria  kidney us wnl    hypokalemia  supplemented  monitor    syncope  f/u cardio  echo done with normal EF
82 y/o Northern Irish speaking male (understands some English), with a PmHx of Dementia, BPH s/p TURP, presented to the [facility] ED with syncope. Pt is very confused and son at bedside was able to translate and provide information. Son states his father normally stays with him in [place] but was staying with his sister in [place] the last two nights because for the past few days his was c/o fever and a cough and they planning on taking him to his PCP in [place] this coming Monday.     Two nights ago son states he was taking Nyquil to help with the fever, coughing and help him sleep. Last night, he was sitting at the table watching television and his daughter's house when his daughter called his name and he wasn't answering. As per pts son, his sister had stated after he wasn't responding to her (she was sitting across the room from him), she had gone to check up on him and he was unresponsive for about 2-3 minutes so she called 911.     As per son, pt denied any chest pain, sob, HA, dizziness, blurred vision, n/v, abd pain. Neg recent travel or sick contacts. Only complaint was coughing, decreased appetitie and fever over the past two days. In the [facility] ED, he was found to have a WBC of 20.84 and a CXR that was showing pneumonia. He was give Azithromycin and Ceftriaxone. He appears comfortable at this time and is being admitted to telemetry for syncope r/o acs and for Pneumonia.  =>Pt has advanced Dementia and was getting agitated in the ED. He was placed on a 1:1 for safety and then family showed up and he calmed down. (22 Jun 2019 07:58)    ER vss.  Pt afebrile.  WBC 20.8 --> 12.7.  UA (-).  RVP (-).  Leg Ag (-).  Bcx (-) at 24 hrs.  Ucx (-).  CT chest with LLL pna and patchy GGOs in RML and RUL.       ID consult called for further abx management.       CAP:    - Pt with sepsis presentation (fever - at home, leukocytosis).  CT chest with predominantly LLL pna with multifocal spread.      - Agree with rocephin.  Legionella Ag (-), can d/c azithromycin.     - Blood cultures x 2 ngtd.      - Leukocytosis resolved, no new fever.  Change to ceftin 500mg po bid to complete total of 7 days, when pt ready for discharge (end date: 6/29).    Will followLeslei Rai  [phone]
84 y/o Gibraltarian speaking male (understands some English), with a PmHx of Dementia, BPH s/p TURP, presented to the [facility] ED with syncope. Admitted to telemetry for r/o acs and for Pneumonia.
84 y/o Kosovan speaking male (understands some English), with a PmHx of Dementia, BPH s/p TURP, presented to the [facility] ED with syncope. Admitted to telemetry for r/o acs and for Pneumonia.
A/P  Hypothyroidism: 83y Male with no history of hypothyroidism current labs show subclinical hypothyroid state, he has dementia, no goiter. started on low dose synthroid.  Dementia: on medications, stable, monitored.    Wander Frye MD  Cell: [phone]  Office: [phone]

## 2019-06-27 LAB
BACTERIA BLD CULT: SIGNIFICANT CHANGE UP
BACTERIA BLD CULT: SIGNIFICANT CHANGE UP

## 2019-11-12 NOTE — H&P ADULT - PROBLEM SELECTOR PLAN 8
PROVIDER:[TOKEN:[[id number]],FOLLOWUP:[4-6 Days]]
monitor closely  if no improvement despite abx and treatment of infection then will consider additional imaging/management

## 2021-10-26 NOTE — PATIENT PROFILE ADULT - FALL HARM RISK
[facility] [address] [place], [place] [address] [facility] [employer] [facility] [address]  Phone: [phone]  Fax: [phone]    Joseph Caldera MD        October 26, 2021     Patient: Caty Cross   YOB: 2004   Date of Visit: 10/26/2021       To Whom it May Concern:    Caty Cross was seen in my clinic on 10/26/2021. She should remain out of school the remainder of this week due to a concussion. If signs and symptoms begin to clear she may attempt a half day on Monday. She will follow up with Dr. Odilia Jiang next Tuesday. If you have any questions or concerns, please don't hesitate to call.     Sincerely,         Joseph Caldera MD other

## 2022-11-19 ENCOUNTER — INPATIENT (INPATIENT)
Facility: HOSPITAL | Age: 87
LOS: 3 days | Discharge: HOME HEALTH | End: 2022-11-23
Attending: INTERNAL MEDICINE | Admitting: INTERNAL MEDICINE

## 2022-11-19 VITALS
OXYGEN SATURATION: 99 % | HEART RATE: 76 BPM | TEMPERATURE: 98 F | SYSTOLIC BLOOD PRESSURE: 128 MMHG | DIASTOLIC BLOOD PRESSURE: 68 MMHG | RESPIRATION RATE: 16 BRPM

## 2022-11-19 DIAGNOSIS — Z90.79 ACQUIRED ABSENCE OF OTHER GENITAL ORGAN(S): Chronic | ICD-10-CM

## 2022-11-19 DIAGNOSIS — R55 SYNCOPE AND COLLAPSE: ICD-10-CM

## 2022-11-19 PROBLEM — F03.90 UNSPECIFIED DEMENTIA WITHOUT BEHAVIORAL DISTURBANCE: Chronic | Status: ACTIVE | Noted: 2019-06-22

## 2022-11-19 LAB
ALBUMIN SERPL ELPH-MCNC: 3.9 G/DL — SIGNIFICANT CHANGE UP (ref 3.3–5)
ALP SERPL-CCNC: 68 U/L — SIGNIFICANT CHANGE UP (ref 40–120)
ALT FLD-CCNC: 16 U/L — SIGNIFICANT CHANGE UP (ref 4–41)
ANION GAP SERPL CALC-SCNC: 13 MMOL/L — SIGNIFICANT CHANGE UP (ref 7–14)
APPEARANCE UR: ABNORMAL
APTT BLD: 26.9 SEC — LOW (ref 27–36.3)
AST SERPL-CCNC: 28 U/L — SIGNIFICANT CHANGE UP (ref 4–40)
B PERT DNA SPEC QL NAA+PROBE: SIGNIFICANT CHANGE UP
B PERT+PARAPERT DNA PNL SPEC NAA+PROBE: SIGNIFICANT CHANGE UP
BACTERIA # UR AUTO: ABNORMAL
BASE EXCESS BLDV CALC-SCNC: -2 MMOL/L — SIGNIFICANT CHANGE UP (ref -2–3)
BASE EXCESS BLDV CALC-SCNC: 1.1 MMOL/L — SIGNIFICANT CHANGE UP (ref -2–3)
BASE EXCESS BLDV CALC-SCNC: 2.5 MMOL/L — SIGNIFICANT CHANGE UP (ref -2–3)
BASOPHILS # BLD AUTO: 0.02 K/UL — SIGNIFICANT CHANGE UP (ref 0–0.2)
BASOPHILS NFR BLD AUTO: 0.3 % — SIGNIFICANT CHANGE UP (ref 0–2)
BILIRUB SERPL-MCNC: 0.2 MG/DL — SIGNIFICANT CHANGE UP (ref 0.2–1.2)
BILIRUB UR-MCNC: NEGATIVE — SIGNIFICANT CHANGE UP
BLOOD GAS VENOUS COMPREHENSIVE RESULT: SIGNIFICANT CHANGE UP
BORDETELLA PARAPERTUSSIS (RAPRVP): SIGNIFICANT CHANGE UP
BUN SERPL-MCNC: 20 MG/DL — SIGNIFICANT CHANGE UP (ref 7–23)
C PNEUM DNA SPEC QL NAA+PROBE: SIGNIFICANT CHANGE UP
CALCIUM SERPL-MCNC: 9.6 MG/DL — SIGNIFICANT CHANGE UP (ref 8.4–10.5)
CHLORIDE BLDV-SCNC: 105 MMOL/L — SIGNIFICANT CHANGE UP (ref 96–108)
CHLORIDE BLDV-SCNC: 107 MMOL/L — SIGNIFICANT CHANGE UP (ref 96–108)
CHLORIDE BLDV-SCNC: 109 MMOL/L — HIGH (ref 96–108)
CHLORIDE SERPL-SCNC: 103 MMOL/L — SIGNIFICANT CHANGE UP (ref 98–107)
CK SERPL-CCNC: 65 U/L — SIGNIFICANT CHANGE UP (ref 30–200)
CO2 BLDV-SCNC: 25.6 MMOL/L — SIGNIFICANT CHANGE UP (ref 22–26)
CO2 BLDV-SCNC: 29.1 MMOL/L — HIGH (ref 22–26)
CO2 BLDV-SCNC: 31.9 MMOL/L — HIGH (ref 22–26)
CO2 SERPL-SCNC: 23 MMOL/L — SIGNIFICANT CHANGE UP (ref 22–31)
COLOR SPEC: ABNORMAL
CREAT SERPL-MCNC: 0.92 MG/DL — SIGNIFICANT CHANGE UP (ref 0.5–1.3)
DIFF PNL FLD: ABNORMAL
EGFR: 82 ML/MIN/1.73M2 — SIGNIFICANT CHANGE UP
EOSINOPHIL # BLD AUTO: 0.11 K/UL — SIGNIFICANT CHANGE UP (ref 0–0.5)
EOSINOPHIL NFR BLD AUTO: 1.8 % — SIGNIFICANT CHANGE UP (ref 0–6)
EPI CELLS # UR: 1 /HPF — SIGNIFICANT CHANGE UP (ref 0–5)
FLUAV SUBTYP SPEC NAA+PROBE: SIGNIFICANT CHANGE UP
FLUBV RNA SPEC QL NAA+PROBE: SIGNIFICANT CHANGE UP
GAS PNL BLDV: 137 MMOL/L — SIGNIFICANT CHANGE UP (ref 136–145)
GAS PNL BLDV: 139 MMOL/L — SIGNIFICANT CHANGE UP (ref 136–145)
GAS PNL BLDV: 140 MMOL/L — SIGNIFICANT CHANGE UP (ref 136–145)
GAS PNL BLDV: SIGNIFICANT CHANGE UP
GAS PNL BLDV: SIGNIFICANT CHANGE UP
GLUCOSE BLDV-MCNC: 101 MG/DL — HIGH (ref 70–99)
GLUCOSE BLDV-MCNC: 105 MG/DL — HIGH (ref 70–99)
GLUCOSE BLDV-MCNC: 96 MG/DL — SIGNIFICANT CHANGE UP (ref 70–99)
GLUCOSE SERPL-MCNC: 96 MG/DL — SIGNIFICANT CHANGE UP (ref 70–99)
GLUCOSE UR QL: NEGATIVE — SIGNIFICANT CHANGE UP
HADV DNA SPEC QL NAA+PROBE: SIGNIFICANT CHANGE UP
HCO3 BLDV-SCNC: 24 MMOL/L — SIGNIFICANT CHANGE UP (ref 22–29)
HCO3 BLDV-SCNC: 28 MMOL/L — SIGNIFICANT CHANGE UP (ref 22–29)
HCO3 BLDV-SCNC: 30 MMOL/L — HIGH (ref 22–29)
HCOV 229E RNA SPEC QL NAA+PROBE: SIGNIFICANT CHANGE UP
HCOV HKU1 RNA SPEC QL NAA+PROBE: SIGNIFICANT CHANGE UP
HCOV NL63 RNA SPEC QL NAA+PROBE: SIGNIFICANT CHANGE UP
HCOV OC43 RNA SPEC QL NAA+PROBE: SIGNIFICANT CHANGE UP
HCT VFR BLD CALC: 34.2 % — LOW (ref 39–50)
HCT VFR BLDA CALC: 29 % — LOW (ref 39–51)
HCT VFR BLDA CALC: 31 % — LOW (ref 39–51)
HCT VFR BLDA CALC: 32 % — LOW (ref 39–51)
HGB BLD CALC-MCNC: 10.4 G/DL — LOW (ref 13–17)
HGB BLD CALC-MCNC: 10.6 G/DL — LOW (ref 13–17)
HGB BLD CALC-MCNC: 9.8 G/DL — LOW (ref 13–17)
HGB BLD-MCNC: 10.7 G/DL — LOW (ref 13–17)
HMPV RNA SPEC QL NAA+PROBE: SIGNIFICANT CHANGE UP
HPIV1 RNA SPEC QL NAA+PROBE: SIGNIFICANT CHANGE UP
HPIV2 RNA SPEC QL NAA+PROBE: SIGNIFICANT CHANGE UP
HPIV3 RNA SPEC QL NAA+PROBE: SIGNIFICANT CHANGE UP
HPIV4 RNA SPEC QL NAA+PROBE: SIGNIFICANT CHANGE UP
IANC: 3.99 K/UL — SIGNIFICANT CHANGE UP (ref 1.8–7.4)
IMM GRANULOCYTES NFR BLD AUTO: 0.3 % — SIGNIFICANT CHANGE UP (ref 0–0.9)
INR BLD: 1.11 RATIO — SIGNIFICANT CHANGE UP (ref 0.88–1.16)
KETONES UR-MCNC: NEGATIVE — SIGNIFICANT CHANGE UP
LACTATE BLDV-MCNC: 1.5 MMOL/L — SIGNIFICANT CHANGE UP (ref 0.5–2)
LACTATE BLDV-MCNC: 1.9 MMOL/L — SIGNIFICANT CHANGE UP (ref 0.5–2)
LACTATE BLDV-MCNC: 2.3 MMOL/L — HIGH (ref 0.5–2)
LEUKOCYTE ESTERASE UR-ACNC: ABNORMAL
LYMPHOCYTES # BLD AUTO: 1.38 K/UL — SIGNIFICANT CHANGE UP (ref 1–3.3)
LYMPHOCYTES # BLD AUTO: 22.7 % — SIGNIFICANT CHANGE UP (ref 13–44)
M PNEUMO DNA SPEC QL NAA+PROBE: SIGNIFICANT CHANGE UP
MAGNESIUM SERPL-MCNC: 2.2 MG/DL — SIGNIFICANT CHANGE UP (ref 1.6–2.6)
MCHC RBC-ENTMCNC: 31.3 GM/DL — LOW (ref 32–36)
MCHC RBC-ENTMCNC: 31.4 PG — SIGNIFICANT CHANGE UP (ref 27–34)
MCV RBC AUTO: 100.3 FL — HIGH (ref 80–100)
MONOCYTES # BLD AUTO: 0.57 K/UL — SIGNIFICANT CHANGE UP (ref 0–0.9)
MONOCYTES NFR BLD AUTO: 9.4 % — SIGNIFICANT CHANGE UP (ref 2–14)
NEUTROPHILS # BLD AUTO: 3.99 K/UL — SIGNIFICANT CHANGE UP (ref 1.8–7.4)
NEUTROPHILS NFR BLD AUTO: 65.5 % — SIGNIFICANT CHANGE UP (ref 43–77)
NITRITE UR-MCNC: NEGATIVE — SIGNIFICANT CHANGE UP
NRBC # BLD: 0 /100 WBCS — SIGNIFICANT CHANGE UP (ref 0–0)
NRBC # FLD: 0 K/UL — SIGNIFICANT CHANGE UP (ref 0–0)
NT-PROBNP SERPL-SCNC: 14 PG/ML — SIGNIFICANT CHANGE UP
PCO2 BLDV: 47 MMHG — SIGNIFICANT CHANGE UP (ref 42–55)
PCO2 BLDV: 51 MMHG — SIGNIFICANT CHANGE UP (ref 42–55)
PCO2 BLDV: 61 MMHG — HIGH (ref 42–55)
PH BLDV: 7.3 — LOW (ref 7.32–7.43)
PH BLDV: 7.32 — SIGNIFICANT CHANGE UP (ref 7.32–7.43)
PH BLDV: 7.34 — SIGNIFICANT CHANGE UP (ref 7.32–7.43)
PH UR: 6.5 — SIGNIFICANT CHANGE UP (ref 5–8)
PLATELET # BLD AUTO: 396 K/UL — SIGNIFICANT CHANGE UP (ref 150–400)
PO2 BLDV: 27 MMHG — SIGNIFICANT CHANGE UP
PO2 BLDV: 37 MMHG — SIGNIFICANT CHANGE UP
PO2 BLDV: 62 MMHG — SIGNIFICANT CHANGE UP
POTASSIUM BLDV-SCNC: 3.7 MMOL/L — SIGNIFICANT CHANGE UP (ref 3.5–5.1)
POTASSIUM BLDV-SCNC: 4 MMOL/L — SIGNIFICANT CHANGE UP (ref 3.5–5.1)
POTASSIUM BLDV-SCNC: 4.9 MMOL/L — SIGNIFICANT CHANGE UP (ref 3.5–5.1)
POTASSIUM SERPL-MCNC: 5 MMOL/L — SIGNIFICANT CHANGE UP (ref 3.5–5.3)
POTASSIUM SERPL-SCNC: 5 MMOL/L — SIGNIFICANT CHANGE UP (ref 3.5–5.3)
PROT SERPL-MCNC: 7.9 G/DL — SIGNIFICANT CHANGE UP (ref 6–8.3)
PROT UR-MCNC: ABNORMAL
PROTHROM AB SERPL-ACNC: 12.9 SEC — SIGNIFICANT CHANGE UP (ref 10.5–13.4)
RAPID RVP RESULT: SIGNIFICANT CHANGE UP
RBC # BLD: 3.41 M/UL — LOW (ref 4.2–5.8)
RBC # FLD: 14.2 % — SIGNIFICANT CHANGE UP (ref 10.3–14.5)
RBC CASTS # UR COMP ASSIST: >50 /HPF — HIGH (ref 0–4)
RSV RNA SPEC QL NAA+PROBE: SIGNIFICANT CHANGE UP
RV+EV RNA SPEC QL NAA+PROBE: SIGNIFICANT CHANGE UP
SAO2 % BLDV: 41.4 % — SIGNIFICANT CHANGE UP
SAO2 % BLDV: 58.9 % — SIGNIFICANT CHANGE UP
SAO2 % BLDV: 90.7 % — SIGNIFICANT CHANGE UP
SARS-COV-2 RNA SPEC QL NAA+PROBE: SIGNIFICANT CHANGE UP
SODIUM SERPL-SCNC: 139 MMOL/L — SIGNIFICANT CHANGE UP (ref 135–145)
SP GR SPEC: >1.05 (ref 1.01–1.05)
TROPONIN T, HIGH SENSITIVITY RESULT: 10 NG/L — SIGNIFICANT CHANGE UP
TROPONIN T, HIGH SENSITIVITY RESULT: 8 NG/L — SIGNIFICANT CHANGE UP
TROPONIN T, HIGH SENSITIVITY RESULT: 9 NG/L — SIGNIFICANT CHANGE UP
UROBILINOGEN FLD QL: SIGNIFICANT CHANGE UP
WBC # BLD: 6.09 K/UL — SIGNIFICANT CHANGE UP (ref 3.8–10.5)
WBC # FLD AUTO: 6.09 K/UL — SIGNIFICANT CHANGE UP (ref 3.8–10.5)
WBC UR QL: >50 /HPF — HIGH (ref 0–5)

## 2022-11-19 PROCEDURE — 93010 ELECTROCARDIOGRAM REPORT: CPT

## 2022-11-19 PROCEDURE — 71275 CT ANGIOGRAPHY CHEST: CPT | Mod: 26,MA

## 2022-11-19 PROCEDURE — 71045 X-RAY EXAM CHEST 1 VIEW: CPT | Mod: 26

## 2022-11-19 PROCEDURE — 99285 EMERGENCY DEPT VISIT HI MDM: CPT

## 2022-11-19 RX ORDER — CEFTRIAXONE 500 MG/1
1000 INJECTION, POWDER, FOR SOLUTION INTRAMUSCULAR; INTRAVENOUS ONCE
Refills: 0 | Status: COMPLETED | OUTPATIENT
Start: 2022-11-19 | End: 2022-11-19

## 2022-11-19 RX ORDER — HALOPERIDOL DECANOATE 100 MG/ML
5 INJECTION INTRAMUSCULAR ONCE
Refills: 0 | Status: COMPLETED | OUTPATIENT
Start: 2022-11-19 | End: 2022-11-19

## 2022-11-19 RX ORDER — SODIUM CHLORIDE 9 MG/ML
500 INJECTION INTRAMUSCULAR; INTRAVENOUS; SUBCUTANEOUS ONCE
Refills: 0 | Status: COMPLETED | OUTPATIENT
Start: 2022-11-19 | End: 2022-11-19

## 2022-11-19 RX ORDER — MIDAZOLAM HYDROCHLORIDE 1 MG/ML
2 INJECTION, SOLUTION INTRAMUSCULAR; INTRAVENOUS ONCE
Refills: 0 | Status: DISCONTINUED | OUTPATIENT
Start: 2022-11-19 | End: 2022-11-19

## 2022-11-19 RX ADMIN — SODIUM CHLORIDE 500 MILLILITER(S): 9 INJECTION INTRAMUSCULAR; INTRAVENOUS; SUBCUTANEOUS at 21:01

## 2022-11-19 RX ADMIN — HALOPERIDOL DECANOATE 5 MILLIGRAM(S): 100 INJECTION INTRAMUSCULAR at 20:00

## 2022-11-19 RX ADMIN — CEFTRIAXONE 100 MILLIGRAM(S): 500 INJECTION, POWDER, FOR SOLUTION INTRAMUSCULAR; INTRAVENOUS at 23:38

## 2022-11-19 RX ADMIN — HALOPERIDOL DECANOATE 5 MILLIGRAM(S): 100 INJECTION INTRAMUSCULAR at 14:02

## 2022-11-19 RX ADMIN — MIDAZOLAM HYDROCHLORIDE 2 MILLIGRAM(S): 1 INJECTION, SOLUTION INTRAMUSCULAR; INTRAVENOUS at 20:15

## 2022-11-19 NOTE — ED PROVIDER NOTE - ATTENDING CONTRIBUTION TO CARE
Agree with resident note  87-year-old male with past medical history of dementia baseline ANO x1 BPH recent admission to outside hospital for over 1 week (unclear etiology), likely due to prostate.  He was in normal usual state of health until this morning then fell unwitnessed to the fourth floor was seated in a chair and stated did not feel well and then fell to the floor.  Family member states was "out" for 1 minute.  Denies any seizure-like activity while on floor.  When EMS arrived patient was awake, blood pressure 90 systolic.    Physical exam  Well-appearing male in no distress vital signs stable blood pressure 120 systolic ANO x1 lungs clear to auscultation bilaterally, S1-S2 no murmurs rubs or gallops, abdomen soft nontender nondistended, extremities no edema neuro full range of motion of all extremities cranial nerves intact    Impression  Syncope differential includes PE viral syndrome ACS EKG unchanged from prior no signs of arrhythmia or ischemia, with 1 week history of being hospitalized recently will work-up for PE with CT angiogram patient will likely need to be admitted for syncope

## 2022-11-19 NOTE — ED PROVIDER NOTE - CLINICAL SUMMARY MEDICAL DECISION MAKING FREE TEXT BOX
87-year-old male with past medical history of dementia, baseline A&O 0-1, BPH, recently admitted to outside hospital for problem related to prostate per family, now status post procedure to prostate per family, presenting to ER via EMS for loss of consciousness for approximately 1 minute while in seated position at home with family.  Family denies generalized shaking, or incontinence of urine.  No head strike. They deny any specific patient complaints.  Patient unable to provide significant history or reliable answers to questions 2/2 to dementia.  EMS reports hypotension to 90s systolic on scene. Family unsure of medications patient is taking on regular basis.  They deny significant past cardiac history. Pt ambulatory independently at baseline. Exam as above. Concern for syncope. Less likely seizure. Consider electrolyte abnormality, glucose abnormality, hypovolemia/dehydration, ELKE, occult infection, medication side effect, anemia, arrhythmia, vasovagal etiology, orthostatic hypotension, autonomic dysfunction, CHF, no stemi on ecg. Labs, imaging, will reassess.

## 2022-11-19 NOTE — ED PROVIDER NOTE - OBJECTIVE STATEMENT
87-year-old male with past medical history of dementia, baseline A&O 0-1, BPH, recently admitted to outside hospital for problem related to prostate per family, now status post procedure to prostate per family, presenting to ER via EMS for loss of consciousness for approximately 1 minute while in seated position at home with family.  Family denies generalized shaking, or incontinence of urine.  No head strike. They deny any specific patient complaints.  Patient unable to provide significant history or reliable answers to questions 2/2 to dementia.  EMS reports hypotension to 90s systolic on scene. Family unsure of medications patient is taking on regular basis.  They deny significant past cardiac history. Pt ambulatory independently at baseline.

## 2022-11-19 NOTE — ED PROVIDER NOTE - PROGRESS NOTE DETAILS
Topher Lopez, PGY-3- pt found to have UTI on UA. Plan pending CTA. CTA negative for PE. Plan to admit for tele and UTI tx.

## 2022-11-19 NOTE — ED PROVIDER NOTE - PHYSICAL EXAMINATION
Gen: Alert Ox0. NAD.   HEENT: Atraumatic. Mucous membranes moist.  CV: RRR. No significant LE edema.   Resp: Unlabored-respirations. CTAB.  GI: Abdomen non tender to palpation, soft.  Skin/MSK: No open wounds.   Neuro: EOMI. Pupils ERRL. Following commands at intermittently.  Psych: Answering questions variably.

## 2022-11-19 NOTE — ED ADULT TRIAGE NOTE - CHIEF COMPLAINT QUOTE
family reports pt found on floor- heard  fall. pt unresposive ,then awake . fs 136 by ems- reported abn ekg.  pmh- dementia ,enlarged prostate- directly to tr b pt awake, alert

## 2022-11-19 NOTE — ED PROVIDER NOTE - CARE PLAN
1 Principal Discharge DX:	Syncope  Secondary Diagnosis:	Altered mental status  Secondary Diagnosis:	Acute UTI

## 2022-11-19 NOTE — ED ADULT NURSE NOTE - OBJECTIVE STATEMENT
pt received to TRB, awake and alert, creole speaking, translation done by family at bedside.  pt was brought to ED after witnessed syncopal episode.  as per EMS, pt showed STEMI on ekg, upon arrival to ED pt was cleared for STEMI by ED MD.  pt placed on tele, SL x 2 placed, labs sent, MD at bedide, awaiting further orders, report given to primary RN Meena

## 2022-11-20 DIAGNOSIS — R91.8 OTHER NONSPECIFIC ABNORMAL FINDING OF LUNG FIELD: ICD-10-CM

## 2022-11-20 DIAGNOSIS — D53.9 NUTRITIONAL ANEMIA, UNSPECIFIED: ICD-10-CM

## 2022-11-20 DIAGNOSIS — N40.0 BENIGN PROSTATIC HYPERPLASIA WITHOUT LOWER URINARY TRACT SYMPTOMS: ICD-10-CM

## 2022-11-20 DIAGNOSIS — E03.9 HYPOTHYROIDISM, UNSPECIFIED: ICD-10-CM

## 2022-11-20 DIAGNOSIS — R55 SYNCOPE AND COLLAPSE: ICD-10-CM

## 2022-11-20 DIAGNOSIS — R31.9 HEMATURIA, UNSPECIFIED: ICD-10-CM

## 2022-11-20 DIAGNOSIS — F03.90 UNSPECIFIED DEMENTIA WITHOUT BEHAVIORAL DISTURBANCE: ICD-10-CM

## 2022-11-20 DIAGNOSIS — Z29.9 ENCOUNTER FOR PROPHYLACTIC MEASURES, UNSPECIFIED: ICD-10-CM

## 2022-11-20 DIAGNOSIS — N39.0 URINARY TRACT INFECTION, SITE NOT SPECIFIED: ICD-10-CM

## 2022-11-20 DIAGNOSIS — R62.7 ADULT FAILURE TO THRIVE: ICD-10-CM

## 2022-11-20 LAB
ALBUMIN SERPL ELPH-MCNC: 3.7 G/DL — SIGNIFICANT CHANGE UP (ref 3.3–5)
ALP SERPL-CCNC: 65 U/L — SIGNIFICANT CHANGE UP (ref 40–120)
ALT FLD-CCNC: 11 U/L — SIGNIFICANT CHANGE UP (ref 4–41)
ANION GAP SERPL CALC-SCNC: 11 MMOL/L — SIGNIFICANT CHANGE UP (ref 7–14)
AST SERPL-CCNC: 20 U/L — SIGNIFICANT CHANGE UP (ref 4–40)
BILIRUB DIRECT SERPL-MCNC: <0.2 MG/DL — SIGNIFICANT CHANGE UP (ref 0–0.3)
BILIRUB INDIRECT FLD-MCNC: >0 MG/DL — SIGNIFICANT CHANGE UP (ref 0–1)
BILIRUB SERPL-MCNC: 0.2 MG/DL — SIGNIFICANT CHANGE UP (ref 0.2–1.2)
BUN SERPL-MCNC: 16 MG/DL — SIGNIFICANT CHANGE UP (ref 7–23)
CALCIUM SERPL-MCNC: 9.3 MG/DL — SIGNIFICANT CHANGE UP (ref 8.4–10.5)
CHLORIDE SERPL-SCNC: 104 MMOL/L — SIGNIFICANT CHANGE UP (ref 98–107)
CO2 SERPL-SCNC: 25 MMOL/L — SIGNIFICANT CHANGE UP (ref 22–31)
CREAT SERPL-MCNC: 0.87 MG/DL — SIGNIFICANT CHANGE UP (ref 0.5–1.3)
EGFR: 85 ML/MIN/1.73M2 — SIGNIFICANT CHANGE UP
GLUCOSE SERPL-MCNC: 130 MG/DL — HIGH (ref 70–99)
HCT VFR BLD CALC: 30.8 % — LOW (ref 39–50)
HGB BLD-MCNC: 9.7 G/DL — LOW (ref 13–17)
INR BLD: 1.17 RATIO — HIGH (ref 0.88–1.16)
MAGNESIUM SERPL-MCNC: 2 MG/DL — SIGNIFICANT CHANGE UP (ref 1.6–2.6)
MCHC RBC-ENTMCNC: 30.1 PG — SIGNIFICANT CHANGE UP (ref 27–34)
MCHC RBC-ENTMCNC: 31.5 GM/DL — LOW (ref 32–36)
MCV RBC AUTO: 95.7 FL — SIGNIFICANT CHANGE UP (ref 80–100)
NRBC # BLD: 0 /100 WBCS — SIGNIFICANT CHANGE UP (ref 0–0)
NRBC # FLD: 0 K/UL — SIGNIFICANT CHANGE UP (ref 0–0)
PHOSPHATE SERPL-MCNC: 2.7 MG/DL — SIGNIFICANT CHANGE UP (ref 2.5–4.5)
PLATELET # BLD AUTO: 345 K/UL — SIGNIFICANT CHANGE UP (ref 150–400)
POTASSIUM SERPL-MCNC: 4 MMOL/L — SIGNIFICANT CHANGE UP (ref 3.5–5.3)
POTASSIUM SERPL-SCNC: 4 MMOL/L — SIGNIFICANT CHANGE UP (ref 3.5–5.3)
PROT SERPL-MCNC: 7.1 G/DL — SIGNIFICANT CHANGE UP (ref 6–8.3)
PROTHROM AB SERPL-ACNC: 13.6 SEC — HIGH (ref 10.5–13.4)
RBC # BLD: 3.22 M/UL — LOW (ref 4.2–5.8)
RBC # FLD: 13.7 % — SIGNIFICANT CHANGE UP (ref 10.3–14.5)
SODIUM SERPL-SCNC: 140 MMOL/L — SIGNIFICANT CHANGE UP (ref 135–145)
TSH SERPL-MCNC: 3.85 UIU/ML — SIGNIFICANT CHANGE UP (ref 0.27–4.2)
WBC # BLD: 3.75 K/UL — LOW (ref 3.8–10.5)
WBC # FLD AUTO: 3.75 K/UL — LOW (ref 3.8–10.5)

## 2022-11-20 PROCEDURE — 12345: CPT | Mod: NC

## 2022-11-20 PROCEDURE — 99223 1ST HOSP IP/OBS HIGH 75: CPT

## 2022-11-20 RX ORDER — LEVOTHYROXINE SODIUM 125 MCG
25 TABLET ORAL DAILY
Refills: 0 | Status: DISCONTINUED | OUTPATIENT
Start: 2022-11-20 | End: 2022-11-23

## 2022-11-20 RX ORDER — CEFTRIAXONE 500 MG/1
1000 INJECTION, POWDER, FOR SOLUTION INTRAMUSCULAR; INTRAVENOUS EVERY 24 HOURS
Refills: 0 | Status: DISCONTINUED | OUTPATIENT
Start: 2022-11-20 | End: 2022-11-21

## 2022-11-20 RX ORDER — HALOPERIDOL DECANOATE 100 MG/ML
5 INJECTION INTRAMUSCULAR ONCE
Refills: 0 | Status: COMPLETED | OUTPATIENT
Start: 2022-11-20 | End: 2022-11-20

## 2022-11-20 RX ORDER — HEPARIN SODIUM 5000 [USP'U]/ML
5000 INJECTION INTRAVENOUS; SUBCUTANEOUS EVERY 12 HOURS
Refills: 0 | Status: DISCONTINUED | OUTPATIENT
Start: 2022-11-20 | End: 2022-11-23

## 2022-11-20 RX ORDER — DONEPEZIL HYDROCHLORIDE 10 MG/1
5 TABLET, FILM COATED ORAL AT BEDTIME
Refills: 0 | Status: DISCONTINUED | OUTPATIENT
Start: 2022-11-20 | End: 2022-11-23

## 2022-11-20 RX ORDER — PANTOPRAZOLE SODIUM 20 MG/1
40 TABLET, DELAYED RELEASE ORAL
Refills: 0 | Status: DISCONTINUED | OUTPATIENT
Start: 2022-11-20 | End: 2022-11-23

## 2022-11-20 RX ORDER — ACETAMINOPHEN 500 MG
650 TABLET ORAL EVERY 6 HOURS
Refills: 0 | Status: DISCONTINUED | OUTPATIENT
Start: 2022-11-20 | End: 2022-11-23

## 2022-11-20 RX ORDER — TAMSULOSIN HYDROCHLORIDE 0.4 MG/1
0.4 CAPSULE ORAL AT BEDTIME
Refills: 0 | Status: DISCONTINUED | OUTPATIENT
Start: 2022-11-20 | End: 2022-11-23

## 2022-11-20 RX ORDER — HALOPERIDOL DECANOATE 100 MG/ML
5 INJECTION INTRAMUSCULAR ONCE
Refills: 0 | Status: COMPLETED | OUTPATIENT
Start: 2022-11-20 | End: 2022-11-21

## 2022-11-20 RX ADMIN — HEPARIN SODIUM 5000 UNIT(S): 5000 INJECTION INTRAVENOUS; SUBCUTANEOUS at 17:07

## 2022-11-20 RX ADMIN — TAMSULOSIN HYDROCHLORIDE 0.4 MILLIGRAM(S): 0.4 CAPSULE ORAL at 23:24

## 2022-11-20 RX ADMIN — HALOPERIDOL DECANOATE 5 MILLIGRAM(S): 100 INJECTION INTRAMUSCULAR at 19:36

## 2022-11-20 RX ADMIN — Medication 25 MICROGRAM(S): at 05:39

## 2022-11-20 RX ADMIN — HEPARIN SODIUM 5000 UNIT(S): 5000 INJECTION INTRAVENOUS; SUBCUTANEOUS at 05:41

## 2022-11-20 RX ADMIN — DONEPEZIL HYDROCHLORIDE 5 MILLIGRAM(S): 10 TABLET, FILM COATED ORAL at 23:23

## 2022-11-20 NOTE — PROGRESS NOTE ADULT - PROBLEM SELECTOR PLAN 1
- pt with episode of syncope, slid from chair to floor without head trauma; found to be hypotensive with EMS  - neuro exam at baseline (AOx1, no focal deficits)  - HDS on arrival  - EKG NSR  - trop neg x2, TSH WNL  - pending orthostatics -- if + would c/w mIVF 75cc/h x10h and repeat  - f/u CTH  - f/u TTE  - will c/w tele for now  - PT eval - pt with episode of syncope, slid from chair to floor without head trauma; found to be hypotensive with EMS  - neuro exam at baseline (AOx1, no focal deficits)  - HDS on arrival  - EKG NSR  - trop neg x2, TSH WNL  - pending orthostatics -- if + would c/w mIVF 75cc/h x10h and repeat  - f/u CTH  - f/u TTE  - PT eval

## 2022-11-20 NOTE — H&P ADULT - PROBLEM SELECTOR PLAN 4
no PE but noted ?focal bronchiolitis on CT. No cough or fever reported. no shortness of breath.   -RVP negative. on ceftriaxone above for UTI  -monitor for fever

## 2022-11-20 NOTE — H&P ADULT - PROBLEM SELECTOR PLAN 1
Possibly in the setting of UTI. check orthostatics.   -EKG normal sinus rhythm. Trops flat  -check CTH, echo  -CTH w/ no PE

## 2022-11-20 NOTE — H&P ADULT - HISTORY OF PRESENT ILLNESS
Pt is an 86 y/o male w/ dementia (Aox0-1), hypothyroid, and BPH s/p recent procedure 1 week ago at OSH was brought to the ER for loss of consciousness for about 1 minute while seated at home today. History obtained from daughter at bedside as pt unable to give history. He was his usual self, able to eat with assistance and at times confused but he all of a sudden lost consciousness, unclear hx of head strike, daughter doesn't think so but no shaking of limbs or urinary/bowel incontinence. Pt did have some abd pain which resolved when he woke up but denied having any other complaint. No new meds reportedly but daughter unsure what his meds are as the brother is the one who handles meds and is on vacation currently. No reported complications with recent procedure. Pt denies any complaints on my evaluation but did not answer all questions. In the ER pt was given fluids and ceftriaxone. pt also given haldol and versed for agitation.

## 2022-11-20 NOTE — PHYSICAL THERAPY INITIAL EVALUATION ADULT - PERTINENT HX OF CURRENT PROBLEM, REHAB EVAL
Patient is 87 year old male with dementia (Aox0-1), ?hypothyroid, and BPH s/p recent procedure 1 week ago at OSH was brought to the ER for loss of consciousness for about 1 minute while seated at home today.

## 2022-11-20 NOTE — H&P ADULT - NSHPREVIEWOFSYSTEMS_GEN_ALL_CORE
limited due to dementia  REVIEW OF SYSTEMS:    CONSTITUTIONAL: no fever  RESPIRATORY: No cough, No shortness of breath  CARDIOVASCULAR: No chest pain   GASTROINTESTINAL: No abdominal pain

## 2022-11-20 NOTE — H&P ADULT - NSHPPHYSICALEXAM_GEN_ALL_CORE
PHYSICAL EXAM:  GENERAL: NAD, well-developed, well-nourished  HEAD:  Atraumatic, Normocephalic  EYES: EOMI, PERRLA, conjunctiva and sclera clear  NECK: Supple, No JVD  CHEST/LUNG: Clear to auscultation bilaterally; No wheezes, rales or rhonchi  HEART: Regular rate and rhythm; No murmurs, rubs, or gallops, (+)S1, S2  ABDOMEN: Soft, Nontender, Nondistended; Normal Bowel sounds   EXTREMITIES:  2+ Peripheral Pulses, No clubbing, cyanosis, or edema  PSYCH: normal mood and affect  NEUROLOGY: AAOx1, moves all extremities equally, unreliably follows commands  SKIN: No rashes or lesions

## 2022-11-20 NOTE — H&P ADULT - PROBLEM SELECTOR PLAN 5
agitated earlier, now calm s/p haldol and versed in ER. confirm meds w/ [facility] pharmacist. resume donepezil from prior medrec

## 2022-11-20 NOTE — CHART NOTE - NSCHARTNOTEFT_GEN_A_CORE
Paged by RN patient is with [facility], assessed patient at bedside, NAD laying comfortably, lungs CTA, heart RRR, normal S1 S2, abdomen is soft and nontender, no guarding with + bowel sounds. Plan: Repeat CBC, and a FOBT. Likely hemorrhoids however cannot rule out other bleed. Case to be discussed with attending.    Bubba TOBIAS [id number]

## 2022-11-20 NOTE — H&P ADULT - PROBLEM SELECTOR PLAN 2
UA positive with WBCs and RBCs. Recent procedure 1 week ago. Hemodynamically stable. Will need collateral regarding which procedure was done  -Continue Ceftriaxone  -check kidney and bladder US  -no pain reported

## 2022-11-20 NOTE — H&P ADULT - NSHPLABSRESULTS_GEN_ALL_CORE
11-19    139  |  103  |  20  ----------------------------<  96  5.0   |  23  |  0.92    Ca    9.6      19 Nov 2022 12:00  Mg     2.20     11-19    TPro  7.9  /  Alb  3.9  /  TBili  0.2  /  DBili  x   /  AST  28  /  ALT  16  /  AlkPhos  68  11-19                            10.7   6.09  )-----------( 396      ( 19 Nov 2022 17:33 )             34.2       CARDIAC MARKERS ( 19 Nov 2022 12:00 )  x     / x     / 65 U/L / x     / x            LIVER FUNCTIONS - ( 19 Nov 2022 12:00 )  Alb: 3.9 g/dL / Pro: 7.9 g/dL / ALK PHOS: 68 U/L / ALT: 16 U/L / AST: 28 U/L / GGT: x             PT/INR - ( 19 Nov 2022 17:33 )   PT: 12.9 sec;   INR: 1.11 ratio         PTT - ( 19 Nov 2022 17:33 )  PTT:26.9 sec    Urinalysis Basic - ( 19 Nov 2022 21:36 )    Color: Dark Brown / Appearance: Turbid / SG: >1.050 / pH: x  Gluc: x / Ketone: Negative  / Bili: Negative / Urobili: <2 mg/dL   Blood: x / Protein: 300 mg/dL / Nitrite: Negative   Leuk Esterase: Large / RBC: >50 /HPF / WBC >50 /HPF   Sq Epi: x / Non Sq Epi: 1 /HPF / Bacteria: Moderate    EKG: normal sinus rhythm    CTA chest: FINDINGS:    LUNGS AND AIRWAYS: Patent central airways.  Bibasilar dependent   atelectasis. Cluster of tree-in bud opacities in the periphery of the   right upper lobe. Multiple right upper and middle lobe 1-2 mm pulmonary   nodules, unchanged since the prior study. For reference, right middle   lobe juxtapleural pulmonary nodule measures 2 mm (3:271). Right upper   lobe calcified granuloma. No lung consolidation or mass.  PLEURA: No pleural effusion.  MEDIASTINUM AND BURKE: No lymphadenopathy.  VESSELS: Adequate pulmonary arterial opacification. Evaluation mildly   degraded by motion. No pulmonary embolism within the confines of this   exam. Main pulmonary artery is not dilated. Thoracic aorta is normal in   caliber. After scrota calcifications of the thoracic aorta.  HEART: Heart size is normal. No pericardial effusion.  CHEST WALL AND LOWER NECK: Within normal limits.  VISUALIZED UPPER ABDOMEN: Within normal limits.  BONES: Degenerative changes of the spine.    IMPRESSION:  No pulmonary embolism.    Cluster of tree-in bud opacities in the periphery of the right upper lobe   likely reflective of a focal bronchiolitis.

## 2022-11-20 NOTE — ED ADULT NURSE REASSESSMENT NOTE - NS ED NURSE REASSESS COMMENT FT1
Report and pt received from RN Jeni, pt A&Ox1 to person only resting on stretcher. Respirations even and unlabored, normal sinus on monitor. IV site patent. No acute distress noted. Pending CT. bed in lowest position, side rails up, safety maintained, family at bedside.
pt A&Ox1(name) creole speaking and at baseline. Pt resting in stretcher. Pt denies chest pain, sob, abd pain, ha, dizziness, n/v/d, fevers/chills at this time. Respirations even and unlabored, no accessory muscle use. pt on cardiac monitor. family at bedside
Pt A&Ox1(name) resting in stretcher. Pt denies chest pain, sob, abd pain, ha, dizziness, n/v/d, fevers/chills at this time. Respirations even and unlabored, no accessory muscle use. On cardiac monitor. Family at bedside

## 2022-11-20 NOTE — H&P ADULT - ASSESSMENT
86 y/o male w/ dementia (Aox0-1), hypothyroid, and BPH s/p recent procedure 1 week ago at OSH was brought to the ER for loss of consciousness for about 1 minute while seated at home today. Found w/ a positive UA. Admitted for further eval.  88 y/o male w/ dementia (Aox0-1), ?hypothyroid, and BPH s/p recent procedure 1 week ago at OSH was brought to the ER for loss of consciousness for about 1 minute while seated at home today. Found w/ a positive UA. Admitted for further eval.

## 2022-11-20 NOTE — PHYSICAL THERAPY INITIAL EVALUATION ADULT - NSPTDISCHREC_GEN_A_CORE
Patient displays confused, unable to follow command for ambulation, please follow PT note for update.

## 2022-11-21 LAB
ANION GAP SERPL CALC-SCNC: 7 MMOL/L — SIGNIFICANT CHANGE UP (ref 7–14)
BASOPHILS # BLD AUTO: 0.02 K/UL — SIGNIFICANT CHANGE UP (ref 0–0.2)
BASOPHILS NFR BLD AUTO: 0.5 % — SIGNIFICANT CHANGE UP (ref 0–2)
BUN SERPL-MCNC: 16 MG/DL — SIGNIFICANT CHANGE UP (ref 7–23)
CALCIUM SERPL-MCNC: 9.5 MG/DL — SIGNIFICANT CHANGE UP (ref 8.4–10.5)
CHLORIDE SERPL-SCNC: 107 MMOL/L — SIGNIFICANT CHANGE UP (ref 98–107)
CO2 SERPL-SCNC: 27 MMOL/L — SIGNIFICANT CHANGE UP (ref 22–31)
CREAT SERPL-MCNC: 0.89 MG/DL — SIGNIFICANT CHANGE UP (ref 0.5–1.3)
CULTURE RESULTS: SIGNIFICANT CHANGE UP
EGFR: 84 ML/MIN/1.73M2 — SIGNIFICANT CHANGE UP
EOSINOPHIL # BLD AUTO: 0.13 K/UL — SIGNIFICANT CHANGE UP (ref 0–0.5)
EOSINOPHIL NFR BLD AUTO: 3.2 % — SIGNIFICANT CHANGE UP (ref 0–6)
FOLATE SERPL-MCNC: 16 NG/ML — SIGNIFICANT CHANGE UP (ref 3.1–17.5)
GLUCOSE SERPL-MCNC: 114 MG/DL — HIGH (ref 70–99)
HCT VFR BLD CALC: 30.9 % — LOW (ref 39–50)
HGB BLD-MCNC: 9.8 G/DL — LOW (ref 13–17)
IANC: 2.64 K/UL — SIGNIFICANT CHANGE UP (ref 1.8–7.4)
IMM GRANULOCYTES NFR BLD AUTO: 0.5 % — SIGNIFICANT CHANGE UP (ref 0–0.9)
INR BLD: 1.16 RATIO — SIGNIFICANT CHANGE UP (ref 0.88–1.16)
LYMPHOCYTES # BLD AUTO: 0.79 K/UL — LOW (ref 1–3.3)
LYMPHOCYTES # BLD AUTO: 19.7 % — SIGNIFICANT CHANGE UP (ref 13–44)
MAGNESIUM SERPL-MCNC: 2.1 MG/DL — SIGNIFICANT CHANGE UP (ref 1.6–2.6)
MCHC RBC-ENTMCNC: 30.8 PG — SIGNIFICANT CHANGE UP (ref 27–34)
MCHC RBC-ENTMCNC: 31.7 GM/DL — LOW (ref 32–36)
MCV RBC AUTO: 97.2 FL — SIGNIFICANT CHANGE UP (ref 80–100)
MONOCYTES # BLD AUTO: 0.41 K/UL — SIGNIFICANT CHANGE UP (ref 0–0.9)
MONOCYTES NFR BLD AUTO: 10.2 % — SIGNIFICANT CHANGE UP (ref 2–14)
NEUTROPHILS # BLD AUTO: 2.64 K/UL — SIGNIFICANT CHANGE UP (ref 1.8–7.4)
NEUTROPHILS NFR BLD AUTO: 65.9 % — SIGNIFICANT CHANGE UP (ref 43–77)
NRBC # BLD: 0 /100 WBCS — SIGNIFICANT CHANGE UP (ref 0–0)
NRBC # FLD: 0 K/UL — SIGNIFICANT CHANGE UP (ref 0–0)
PHOSPHATE SERPL-MCNC: 3.1 MG/DL — SIGNIFICANT CHANGE UP (ref 2.5–4.5)
PLATELET # BLD AUTO: 331 K/UL — SIGNIFICANT CHANGE UP (ref 150–400)
POTASSIUM SERPL-MCNC: 3.9 MMOL/L — SIGNIFICANT CHANGE UP (ref 3.5–5.3)
POTASSIUM SERPL-SCNC: 3.9 MMOL/L — SIGNIFICANT CHANGE UP (ref 3.5–5.3)
PROTHROM AB SERPL-ACNC: 13.5 SEC — HIGH (ref 10.5–13.4)
RBC # BLD: 3.18 M/UL — LOW (ref 4.2–5.8)
RBC # FLD: 13.9 % — SIGNIFICANT CHANGE UP (ref 10.3–14.5)
SODIUM SERPL-SCNC: 141 MMOL/L — SIGNIFICANT CHANGE UP (ref 135–145)
SPECIMEN SOURCE: SIGNIFICANT CHANGE UP
VIT B12 SERPL-MCNC: 1242 PG/ML — HIGH (ref 200–900)
WBC # BLD: 4.01 K/UL — SIGNIFICANT CHANGE UP (ref 3.8–10.5)
WBC # FLD AUTO: 4.01 K/UL — SIGNIFICANT CHANGE UP (ref 3.8–10.5)

## 2022-11-21 PROCEDURE — 93306 TTE W/DOPPLER COMPLETE: CPT | Mod: 26

## 2022-11-21 PROCEDURE — 76770 US EXAM ABDO BACK WALL COMP: CPT | Mod: 26

## 2022-11-21 PROCEDURE — 70450 CT HEAD/BRAIN W/O DYE: CPT | Mod: 26

## 2022-11-21 RX ORDER — CEFTRIAXONE 500 MG/1
1000 INJECTION, POWDER, FOR SOLUTION INTRAMUSCULAR; INTRAVENOUS EVERY 24 HOURS
Refills: 0 | Status: DISCONTINUED | OUTPATIENT
Start: 2022-11-22 | End: 2022-11-23

## 2022-11-21 RX ORDER — RISPERIDONE 4 MG/1
1 TABLET ORAL
Qty: 0 | Refills: 0 | DISCHARGE

## 2022-11-21 RX ORDER — TAMSULOSIN HYDROCHLORIDE 0.4 MG/1
1 CAPSULE ORAL
Qty: 0 | Refills: 0 | DISCHARGE

## 2022-11-21 RX ORDER — LATANOPROST 0.05 MG/ML
1 SOLUTION/ DROPS OPHTHALMIC; TOPICAL
Qty: 0 | Refills: 0 | DISCHARGE

## 2022-11-21 RX ORDER — FINASTERIDE 5 MG/1
1 TABLET, FILM COATED ORAL
Qty: 0 | Refills: 0 | DISCHARGE

## 2022-11-21 RX ORDER — DONEPEZIL HYDROCHLORIDE 10 MG/1
1 TABLET, FILM COATED ORAL
Qty: 0 | Refills: 0 | DISCHARGE

## 2022-11-21 RX ADMIN — HEPARIN SODIUM 5000 UNIT(S): 5000 INJECTION INTRAVENOUS; SUBCUTANEOUS at 06:55

## 2022-11-21 RX ADMIN — HALOPERIDOL DECANOATE 5 MILLIGRAM(S): 100 INJECTION INTRAMUSCULAR at 00:07

## 2022-11-21 RX ADMIN — HEPARIN SODIUM 5000 UNIT(S): 5000 INJECTION INTRAVENOUS; SUBCUTANEOUS at 17:39

## 2022-11-21 RX ADMIN — DONEPEZIL HYDROCHLORIDE 5 MILLIGRAM(S): 10 TABLET, FILM COATED ORAL at 21:49

## 2022-11-21 RX ADMIN — CEFTRIAXONE 100 MILLIGRAM(S): 500 INJECTION, POWDER, FOR SOLUTION INTRAMUSCULAR; INTRAVENOUS at 01:47

## 2022-11-21 RX ADMIN — TAMSULOSIN HYDROCHLORIDE 0.4 MILLIGRAM(S): 0.4 CAPSULE ORAL at 21:49

## 2022-11-21 RX ADMIN — PANTOPRAZOLE SODIUM 40 MILLIGRAM(S): 20 TABLET, DELAYED RELEASE ORAL at 06:55

## 2022-11-21 RX ADMIN — Medication 25 MICROGRAM(S): at 06:55

## 2022-11-21 NOTE — CONSULT NOTE ADULT - SUBJECTIVE AND OBJECTIVE BOX
DATE OF SERVICE: 11-22-22 @ 00:03    CHIEF COMPLAINT:Patient is a 87y old  Male who presents with a chief complaint of syncope, UTI (21 Nov 2022 12:25)      HISTORY OF PRESENT ILLNESS:HPI:  Pt is an 88 y/o male w/ dementia (Aox0-1), hypothyroid, and BPH s/p recent procedure 1 week ago at OSH was brought to the ER for loss of consciousness for about 1 minute while seated at home today. History obtained from daughter at bedside as pt unable to give history. He was his usual self, able to eat with assistance and at times confused but he all of a sudden lost consciousness, unclear hx of head strike, daughter doesn't think so but no shaking of limbs or urinary/bowel incontinence. Pt did have some abd pain which resolved when he woke up but denied having any other complaint. No new meds reportedly but daughter unsure what his meds are as the brother is the one who handles meds and is on vacation currently. No reported complications with recent procedure. Pt denies any complaints on my evaluation but did not answer all questions. In the ER pt was given fluids and ceftriaxone. pt also given haldol and versed for agitation.  (20 Nov 2022 01:15)      PAST MEDICAL & SURGICAL HISTORY:  Enlarged prostate      Dementia      S/P TURP (transurethral resection of prostate)              MEDICATIONS:  heparin   Injectable 5000 Unit(s) SubCutaneous every 12 hours        acetaminophen     Tablet .. 650 milliGRAM(s) Oral every 6 hours PRN  donepezil 5 milliGRAM(s) Oral at bedtime    pantoprazole    Tablet 40 milliGRAM(s) Oral before breakfast    levothyroxine 25 MICROGram(s) Oral daily    tamsulosin 0.4 milliGRAM(s) Oral at bedtime      FAMILY HISTORY:  No significant family history        Non-contributory    SOCIAL HISTORY:    [ ] Tobacco  [ ] Drugs  [ ] Alcohol    Allergies    No Known Allergies    Intolerances    	    REVIEW OF SYSTEMS:  CONSTITUTIONAL: No fever  EYES: No eye pain, visual disturbances, or discharge  ENMT:  No difficulty hearing, tinnitus  NECK: No pain or stiffness  RESPIRATORY: No cough, wheezing,  CARDIOVASCULAR: No chest pain, palpitations, passing out, dizziness, or leg swelling  GASTROINTESTINAL:  No nausea, vomiting, diarrhea or constipation. No melena.  GENITOURINARY: No dysuria, hematuria  NEUROLOGICAL: No stroke like symptoms  SKIN: No burning or lesions   ENDOCRINE: No heat or cold intolerance  MUSCULOSKELETAL: No joint pain or swelling  PSYCHIATRIC: No  anxiety, mood swings  HEME/LYMPH: No bleeding gums  ALLERGY AND IMMUNOLOGIC: No hives or eczema	    All other ROS negative    PHYSICAL EXAM:  T(C): 36.7 (11-21-22 @ 21:01), Max: 36.7 (11-21-22 @ 06:55)  HR: 83 (11-21-22 @ 21:01) (78 - 85)  BP: 107/51 (11-21-22 @ 21:01) (107/51 - 121/61)  RR: 18 (11-21-22 @ 21:01) (17 - 18)  SpO2: 98% (11-21-22 @ 21:01) (98% - 100%)  Wt(kg): --  I&O's Summary      Appearance: Normal	  HEENT:   Normal oral mucosa, EOMI	  Cardiovascular:  S1 S2, No JVD,    Respiratory: Lungs clear to auscultation	  Psychiatry: Alert  Gastrointestinal:  Soft, Non-tender, + BS	  Skin: No rashes   Neurologic: Non-focal  Extremities:  No edema  Vascular: Peripheral pulses palpable    	    	  	  CARDIAC MARKERS:  Labs personally reviewed by me                                  9.8    4.01  )-----------( 331      ( 21 Nov 2022 12:00 )             30.9     11-21    141  |  107  |  16  ----------------------------<  114<H>  3.9   |  27  |  0.89    Ca    9.5      21 Nov 2022 12:00  Phos  3.1     11-21  Mg     2.10     11-21    TPro  7.1  /  Alb  3.7  /  TBili  0.2  /  DBili  <0.2  /  AST  20  /  ALT  11  /  AlkPhos  65  11-20          EKG: Personally reviewed by me -   Radiology: Personally reviewed by me -       Assessment /Plan:     1. Syncope likely 2/2 UTI, full recs to follow       Differential diagnosis and plan of care discussed with patient after the evaluation. Counseling on diet, nutritional counseling, weight management, exercise and medication compliance was done.   Advanced care planning/advanced directives discussed with patient/family. DNR status including forceful chest compressions to attempt to restart the heart, ventilator support/artificial breathing, electric shock, artificial nutrition, health care proxy, Molst form all discussed with pt. Pt wishes to consider. More than fifteen minutes spent on discussing advanced directives.           Felix Mc DO [facility]  Cardiovascular Medicine  [address] [place] [address], [address]  Office [phone]  Available via call/text on [employer] Teams DATE OF SERVICE: 11-22-22 @ 00:03    CHIEF COMPLAINT:Patient is a 87y old  Male who presents with a chief complaint of syncope, UTI (21 Nov 2022 12:25)      HISTORY OF PRESENT ILLNESS:HPI:  Pt is an 88 y/o male w/ dementia (Aox0-1), hypothyroid, and BPH s/p recent procedure 1 week ago at OSH was brought to the ER for loss of consciousness for about 1 minute while seated at home today. History obtained from daughter at bedside as pt unable to give history. He was his usual self, able to eat with assistance and at times confused but he all of a sudden lost consciousness, unclear hx of head strike, daughter doesn't think so but no shaking of limbs or urinary/bowel incontinence. Pt did have some abd pain which resolved when he woke up but denied having any other complaint. No new meds reportedly but daughter unsure what his meds are as the brother is the one who handles meds and is on vacation currently. No reported complications with recent procedure. Pt denies any complaints on my evaluation but did not answer all questions. In the ER pt was given fluids and ceftriaxone. pt also given haldol and versed for agitation.  (20 Nov 2022 01:15)      PAST MEDICAL & SURGICAL HISTORY:  Enlarged prostate      Dementia      S/P TURP (transurethral resection of prostate)              MEDICATIONS:  heparin   Injectable 5000 Unit(s) SubCutaneous every 12 hours        acetaminophen     Tablet .. 650 milliGRAM(s) Oral every 6 hours PRN  donepezil 5 milliGRAM(s) Oral at bedtime    pantoprazole    Tablet 40 milliGRAM(s) Oral before breakfast    levothyroxine 25 MICROGram(s) Oral daily    tamsulosin 0.4 milliGRAM(s) Oral at bedtime      FAMILY HISTORY:  No significant family history        Non-contributory    SOCIAL HISTORY:    [ ] Tobacco  [ ] Drugs  [ ] Alcohol    Allergies    No Known Allergies    Intolerances    	    REVIEW OF SYSTEMS:  CONSTITUTIONAL: No fever  EYES: No eye pain, visual disturbances, or discharge  ENMT:  No difficulty hearing, tinnitus  NECK: No pain or stiffness  RESPIRATORY: No cough, wheezing,  CARDIOVASCULAR: No chest pain, palpitations, passing out, dizziness, or leg swelling  GASTROINTESTINAL:  No nausea, vomiting, diarrhea or constipation. No melena.  GENITOURINARY: No dysuria, hematuria  NEUROLOGICAL: No stroke like symptoms  SKIN: No burning or lesions   ENDOCRINE: No heat or cold intolerance  MUSCULOSKELETAL: No joint pain or swelling  PSYCHIATRIC: No  anxiety, mood swings  HEME/LYMPH: No bleeding gums  ALLERGY AND IMMUNOLOGIC: No hives or eczema	    All other ROS negative    PHYSICAL EXAM:  T(C): 36.7 (11-21-22 @ 21:01), Max: 36.7 (11-21-22 @ 06:55)  HR: 83 (11-21-22 @ 21:01) (78 - 85)  BP: 107/51 (11-21-22 @ 21:01) (107/51 - 121/61)  RR: 18 (11-21-22 @ 21:01) (17 - 18)  SpO2: 98% (11-21-22 @ 21:01) (98% - 100%)  Wt(kg): --  I&O's Summary      Appearance: Normal	  HEENT:   Normal oral mucosa, EOMI	  Cardiovascular:  S1 S2, No JVD,    Respiratory: Lungs clear to auscultation	  Psychiatry: Alert  Gastrointestinal:  Soft, Non-tender, + BS	  Skin: No rashes   Neurologic: Non-focal  Extremities:  No edema  Vascular: Peripheral pulses palpable    	    	  	  CARDIAC MARKERS:  Labs personally reviewed by me                                  9.8    4.01  )-----------( 331      ( 21 Nov 2022 12:00 )             30.9     11-21    141  |  107  |  16  ----------------------------<  114<H>  3.9   |  27  |  0.89    Ca    9.5      21 Nov 2022 12:00  Phos  3.1     11-21  Mg     2.10     11-21    TPro  7.1  /  Alb  3.7  /  TBili  0.2  /  DBili  <0.2  /  AST  20  /  ALT  11  /  AlkPhos  65  11-20          EKG: Personally reviewed by me -   Radiology: Personally reviewed by me -   < from: CT Angio Chest PE Protocol w/ IV Cont (11.19.22 @ 20:37) >  IMPRESSION:  No pulmonary embolism.  Cluster of tree-in bud opacities in the periphery of the right upper lobe   likely reflective of a focal bronchiolitis.      < end of copied text >      < from: Transthoracic Echocardiogram (06.24.19 @ 13:50) >  CONCLUSIONS:  1. Mitral annular calcification, otherwise normal mitral  valve. Minimal mitral regurgitation.  2. Normal left ventricular internal dimensions and wall  thicknesses.  3. Normal left ventricular systolic function. No segmental  wall motion abnormalities.  4. Normal right ventricular size and function.    < end of copied text >      ASSESSMENT/PLAN: 	    Pt is an 86 yo M with PMH dementia (AOx1, self) and BPH (s/p TURP) p/w syncopal episode, abd pain, and poor PO intake c/f FTT. Also with c/o hematuria, for which he has been following with urology outpt. Found to have concentrated UA with hematuria, mild lactic acidosis that has resolved, and pending orthostatics / renal US / TTE. PT consulted and pending evaluation.     1. Syncope  - EKG NSR  - Carotid US with no significant hemodynamic stenosis  - Patient refused TTE. Prior TTE from 2019 unremarkable.  - CT Head negative for acute hemorrhage, shift or infarct.   - Monitor on tele  - Neuro consult appreciated  - Check orthos if patient able  - Syncope likely d/t hypotension secondary to acute UTI.   - c/w IV Abx. Follow up UCx.  - Consider OP holter monitor.     2. UTI  - + UA  - c/w IV Ceftriaxone  - follow up Urine Cultures  - Monitor for fevers, currently no leukocytosis    3. BPH  - c/w Tamsulosin    4. DVT PPx  - c/w SQ heparin        Differential diagnosis and plan of care discussed with patient after the evaluation. Counseling on diet, nutritional counseling, weight management, exercise and medication compliance was done.   Advanced care planning/advanced directives discussed with patient/family. DNR status including forceful chest compressions to attempt to restart the heart, ventilator support/artificial breathing, electric shock, artificial nutrition, health care proxy, Molst form all discussed with pt. Pt wishes to consider. More than fifteen minutes spent on discussing advanced directives.           Felix Mc DO [facility]  Cardiovascular Medicine  [address] [address] [facility], [address]  Office [phone]  Available via call/text on [employer] Teams

## 2022-11-21 NOTE — PHARMACOTHERAPY INTERVENTION NOTE - COMMENTS
Medication history is currently saved as incomplete. Unable to verify patient's medication list with outpatient pharmacy ([facility]), closed Sunday. Will follow up Monday morning and update OMR accordingly. 
Medication history is saved as incomplete. Unable to confirm home meds w/family. Outpatient Medication Review (OMR) updated based on fill information from [facility].   Per Pharmacy:   - No fill history on file for Tamsulosin or Levothyroxine ?   - Finasteride 5mg QD dispensed on 11/10/22  - Donepezil 23mg, Risperdal 0.5mg & Remeron 7.5mg last dispensed on 8/26/22 x 30 days (unsure if patient is still taking?)

## 2022-11-21 NOTE — PROGRESS NOTE ADULT - PROBLEM SELECTOR PLAN 1
- pt with episode of syncope, slid from chair to floor without head trauma; found to be hypotensive with EMS  - neuro exam at baseline (AOx1, no focal deficits)  - HDS on arrival  - EKG NSR  - trop neg x2, TSH WNL  - pending orthostatics -- if + would c/w mIVF 75cc/h x10h and repeat  - Neuro eval called

## 2022-11-22 LAB
ANION GAP SERPL CALC-SCNC: 10 MMOL/L — SIGNIFICANT CHANGE UP (ref 7–14)
BASOPHILS # BLD AUTO: 0.02 K/UL — SIGNIFICANT CHANGE UP (ref 0–0.2)
BASOPHILS NFR BLD AUTO: 0.4 % — SIGNIFICANT CHANGE UP (ref 0–2)
BUN SERPL-MCNC: 12 MG/DL — SIGNIFICANT CHANGE UP (ref 7–23)
CALCIUM SERPL-MCNC: 9.3 MG/DL — SIGNIFICANT CHANGE UP (ref 8.4–10.5)
CHLORIDE SERPL-SCNC: 104 MMOL/L — SIGNIFICANT CHANGE UP (ref 98–107)
CO2 SERPL-SCNC: 25 MMOL/L — SIGNIFICANT CHANGE UP (ref 22–31)
CREAT SERPL-MCNC: 0.88 MG/DL — SIGNIFICANT CHANGE UP (ref 0.5–1.3)
EGFR: 84 ML/MIN/1.73M2 — SIGNIFICANT CHANGE UP
EOSINOPHIL # BLD AUTO: 0.18 K/UL — SIGNIFICANT CHANGE UP (ref 0–0.5)
EOSINOPHIL NFR BLD AUTO: 3.7 % — SIGNIFICANT CHANGE UP (ref 0–6)
GLUCOSE SERPL-MCNC: 88 MG/DL — SIGNIFICANT CHANGE UP (ref 70–99)
HCT VFR BLD CALC: 29.8 % — LOW (ref 39–50)
HGB BLD-MCNC: 9.6 G/DL — LOW (ref 13–17)
IANC: 2.61 K/UL — SIGNIFICANT CHANGE UP (ref 1.8–7.4)
IMM GRANULOCYTES NFR BLD AUTO: 0.2 % — SIGNIFICANT CHANGE UP (ref 0–0.9)
LYMPHOCYTES # BLD AUTO: 1.49 K/UL — SIGNIFICANT CHANGE UP (ref 1–3.3)
LYMPHOCYTES # BLD AUTO: 30.8 % — SIGNIFICANT CHANGE UP (ref 13–44)
MAGNESIUM SERPL-MCNC: 2.1 MG/DL — SIGNIFICANT CHANGE UP (ref 1.6–2.6)
MCHC RBC-ENTMCNC: 31.1 PG — SIGNIFICANT CHANGE UP (ref 27–34)
MCHC RBC-ENTMCNC: 32.2 GM/DL — SIGNIFICANT CHANGE UP (ref 32–36)
MCV RBC AUTO: 96.4 FL — SIGNIFICANT CHANGE UP (ref 80–100)
MONOCYTES # BLD AUTO: 0.53 K/UL — SIGNIFICANT CHANGE UP (ref 0–0.9)
MONOCYTES NFR BLD AUTO: 11 % — SIGNIFICANT CHANGE UP (ref 2–14)
NEUTROPHILS # BLD AUTO: 2.61 K/UL — SIGNIFICANT CHANGE UP (ref 1.8–7.4)
NEUTROPHILS NFR BLD AUTO: 53.9 % — SIGNIFICANT CHANGE UP (ref 43–77)
NRBC # BLD: 0 /100 WBCS — SIGNIFICANT CHANGE UP (ref 0–0)
NRBC # FLD: 0 K/UL — SIGNIFICANT CHANGE UP (ref 0–0)
PHOSPHATE SERPL-MCNC: 3.5 MG/DL — SIGNIFICANT CHANGE UP (ref 2.5–4.5)
PLATELET # BLD AUTO: 345 K/UL — SIGNIFICANT CHANGE UP (ref 150–400)
POTASSIUM SERPL-MCNC: 3.8 MMOL/L — SIGNIFICANT CHANGE UP (ref 3.5–5.3)
POTASSIUM SERPL-SCNC: 3.8 MMOL/L — SIGNIFICANT CHANGE UP (ref 3.5–5.3)
RBC # BLD: 3.09 M/UL — LOW (ref 4.2–5.8)
RBC # FLD: 13.6 % — SIGNIFICANT CHANGE UP (ref 10.3–14.5)
SODIUM SERPL-SCNC: 139 MMOL/L — SIGNIFICANT CHANGE UP (ref 135–145)
WBC # BLD: 4.84 K/UL — SIGNIFICANT CHANGE UP (ref 3.8–10.5)
WBC # FLD AUTO: 4.84 K/UL — SIGNIFICANT CHANGE UP (ref 3.8–10.5)

## 2022-11-22 PROCEDURE — 90792 PSYCH DIAG EVAL W/MED SRVCS: CPT

## 2022-11-22 PROCEDURE — 95816 EEG AWAKE AND DROWSY: CPT | Mod: 26

## 2022-11-22 PROCEDURE — 93880 EXTRACRANIAL BILAT STUDY: CPT | Mod: 26

## 2022-11-22 RX ORDER — HALOPERIDOL DECANOATE 100 MG/ML
1 INJECTION INTRAMUSCULAR EVERY 6 HOURS
Refills: 0 | Status: DISCONTINUED | OUTPATIENT
Start: 2022-11-22 | End: 2022-11-23

## 2022-11-22 RX ADMIN — Medication 25 MICROGRAM(S): at 05:59

## 2022-11-22 RX ADMIN — CEFTRIAXONE 100 MILLIGRAM(S): 500 INJECTION, POWDER, FOR SOLUTION INTRAMUSCULAR; INTRAVENOUS at 01:16

## 2022-11-22 RX ADMIN — HEPARIN SODIUM 5000 UNIT(S): 5000 INJECTION INTRAVENOUS; SUBCUTANEOUS at 17:37

## 2022-11-22 RX ADMIN — PANTOPRAZOLE SODIUM 40 MILLIGRAM(S): 20 TABLET, DELAYED RELEASE ORAL at 05:59

## 2022-11-22 RX ADMIN — HEPARIN SODIUM 5000 UNIT(S): 5000 INJECTION INTRAVENOUS; SUBCUTANEOUS at 06:00

## 2022-11-22 RX ADMIN — TAMSULOSIN HYDROCHLORIDE 0.4 MILLIGRAM(S): 0.4 CAPSULE ORAL at 21:31

## 2022-11-22 RX ADMIN — HALOPERIDOL DECANOATE 1 MILLIGRAM(S): 100 INJECTION INTRAMUSCULAR at 23:55

## 2022-11-22 RX ADMIN — DONEPEZIL HYDROCHLORIDE 5 MILLIGRAM(S): 10 TABLET, FILM COATED ORAL at 21:31

## 2022-11-22 NOTE — BH CONSULTATION LIAISON ASSESSMENT NOTE - RISK ASSESSMENT
risk: male gender, delirium  Protective: no recent SI, no SA, no psych hx, support from wife, domiciled  risk: male gender, delirium, dementia  Protective: no recent SI, no SA, no psych hx, support from wife, domiciled     Not at acute risk of harm to self or others at this time.

## 2022-11-22 NOTE — BH CONSULTATION LIAISON ASSESSMENT NOTE - NSHPLANGTRANSLATORFT_GEN_A_CORE
PCA at bedside speaking creole  Staff at bedside speaking creole   Pt. also able to speak English at times  Due to confusion, it was unlikely pt. will participate wit

## 2022-11-22 NOTE — EEG REPORT - NS EEG TEXT BOX
REPORT OF ROUTINE EEG WITH VIDEO  [facility]: [address], [address] [place] [address], Phone: [phone] [facility]: [address] [place] [address], Phone: [phone] Office: [address] [place] [address], [place] [address], Phone: [phone]  Patient Name: RAMIRO KHAN   Age: - : - MRN #: -, Location: [address] Referring Physician: -  EEG #: [id number] Study Date: 2022   Start Time: 12:39:16 PM    Study Duration: 22.1 		  Technical Information:					 On Instrument: [id number] Placement and Labeling of Electrodes: The EEG was performed utilizing 20 channels referential EEG connections (coronal over temporal over parasagittal montage) using all standard 10-20 electrode placements with EKG.  Recording was at a sampling rate of 256 samples per second per channel.  Time synchronized digital video recording was done simultaneously with EEG recording.  A low light infrared camera was used for low light recording.  Sanket and seizure detection algorithms were utilized. CSA Technical Component: Quantitative EEG analysis using a separate Compressed Spectral Array (CSA) software package was conducted in real-time and run at bedside after set up by the technician, digitally displaying the power of electrographic frequencies included in the 1-30Hz band using a graded color map.  This data was reviewed and interpreted independently, and is reported in a separate section below.  History:  ROUTINE EEG AT BEDSIDE 460 [address] 87 YEAR OLD MALE / COR: AWAKE / DROWSY / CONFUSED P/W SYNCOPE & COLLAPSE PMH:DEMENTIA, ENLARGED PROSTAE, S/P TURP HV: NOT COMPLETED DUE TO PANDEMIC PHOTIC: COMPLETED A&OX 1 CONCERN FOR SEIZURES  Medication Flomax Protonix Synthroid Rocephin Heparin  [Abbreviation Key:  PDR=alpha rhythm/posterior dominant rhythm. A-P=anterior posterior.  Amplitude: ‘very low’:<20; ‘low’:20-49; ‘medium’:; ‘high’:>150uV.  Persistence for periodic/rhythmic patterns (% of epoch) ‘rare’:<1%; ‘occasional’:1-10%; ‘frequent’:10-50%; ‘abundant’:50-90%; ‘continuous’:>90%.  Persistence for sporadic discharges: ‘rare’:<1/hr; ‘occasional’:1/min-1/hr; ‘frequent’:>1/min; ‘abundant’:>1/10 sec.  RPP=rhythmic and periodic patterns; GRDA=generalized rhythmic delta activity; FIRDA=frontal intermittent GRDA; LRDA=lateralized rhythmic delta activity; TIRDA=temporal intermittent rhythmic delta activity;  LPD=PLED=lateralized periodic discharges; GPD=generalized periodic discharges; BIPDs =bilateral independent periodic discharges; Mf=multifocal; SIRPDs=stimulus induced rhythmic, periodic, or ictal appearing discharges; BIRDs=brief potentially ictal rhythmic discharges >4 Hz, lasting .5-10s; PFA (paroxysmal bursts >13 Hz or =8 Hz <10s).  Modifiers: +F=with fast component; +S=with spike component; +R=with rhythmic component.  S-B=burst suppression pattern.  Max=maximal. N1-drowsy; N2-stage II sleep; N3-slow wave sleep. SSS/BETS=small sharp spikes/benign epileptiform transients of sleep. HV=hyperventilation; PS=photic stimulation]  Study Interpretation:  FINDINGS:    Background: Symmetry: symmetric Continuous: continuous PDR: symmetric, well-modulated 7 Hz activity, with amplitude to 40 uV, that attenuated to eye opening.  Low amplitude frontal beta noted in wakefulness. Reactivity: present Voltage: normal, mostly 20-150uV Anterior Posterior Gradient: present Breach: absent  Background Slowing: Generalized slowing: none was present. Focal slowing: none was present.  State Changes:  Stage II sleep transients were not recorded.  Sporadic Epileptiform Discharges:  None  Rhythmic and Periodic Patterns (RPPs): None   Electrographic and Electroclinical seizures: None  Other Clinical Events: None  Activation Procedures:  -Hyperventilation was not performed.   -Photic stimulation was performed and did not elicit any abnormalities.    Artifacts: Intermittent myogenic and movement artifacts were noted.  ECG: The heart rate on single channel ECG was predominantly between 60-80BPM.  EEG Classification / Summary:  Abnormal EEG awake / drowsy / sleep  -background slowing, mild to moderate, generalized  Clinical Impression: Findings indicate:  Mild to moderate nonspecific diffuse or multifocal cerebral dysfunction.  No epileptiform pattern or seizure seen.  ________________________________________  Frederick Banegas MD Epilepsy Fellow , [facility] Department of Neurology, [facility] [employer]  [facility] EEG Reading Room Ph#: [phone] Epilepsy Answering Service after 5PM and before 8:30AM: Ph#: [phone]    REPORT OF ROUTINE EEG WITH VIDEO  [facility]: [address], [address] [place] [address], Phone: [phone] [facility]: [address] [place] [address], Phone: [phone] Office: [address] [place] [address], [place] [address], Phone: [phone]  Patient Name: RAMIRO KHAN   Age: - : - MRN #: -, Location: [address] Referring Physician: -  EEG #: [id number] Study Date: 2022   Start Time: 12:39:16 PM    Study Duration: 22.1 		  Technical Information:					 On Instrument: [id number] Placement and Labeling of Electrodes: The EEG was performed utilizing 20 channels referential EEG connections (coronal over temporal over parasagittal montage) using all standard 10-20 electrode placements with EKG.  Recording was at a sampling rate of 256 samples per second per channel.  Time synchronized digital video recording was done simultaneously with EEG recording.  A low light infrared camera was used for low light recording.  Sanket and seizure detection algorithms were utilized. CSA Technical Component: Quantitative EEG analysis using a separate Compressed Spectral Array (CSA) software package was conducted in real-time and run at bedside after set up by the technician, digitally displaying the power of electrographic frequencies included in the 1-30Hz band using a graded color map.  This data was reviewed and interpreted independently, and is reported in a separate section below.  History:  ROUTINE EEG AT BEDSIDE [address] 87 YEAR OLD MALE / COR: AWAKE / DROWSY / CONFUSED P/W SYNCOPE & COLLAPSE PMH:DEMENTIA, ENLARGED PROSTAE, S/P TURP HV: NOT COMPLETED DUE TO PANDEMIC PHOTIC: COMPLETED A&OX 1 CONCERN FOR SEIZURES  Medication Flomax Protonix Synthroid Rocephin Heparin  [Abbreviation Key:  PDR=alpha rhythm/posterior dominant rhythm. A-P=anterior posterior.  Amplitude: ‘very low’:<20; ‘low’:20-49; ‘medium’:; ‘high’:>150uV.  Persistence for periodic/rhythmic patterns (% of epoch) ‘rare’:<1%; ‘occasional’:1-10%; ‘frequent’:10-50%; ‘abundant’:50-90%; ‘continuous’:>90%.  Persistence for sporadic discharges: ‘rare’:<1/hr; ‘occasional’:1/min-1/hr; ‘frequent’:>1/min; ‘abundant’:>1/10 sec.  RPP=rhythmic and periodic patterns; GRDA=generalized rhythmic delta activity; FIRDA=frontal intermittent GRDA; LRDA=lateralized rhythmic delta activity; TIRDA=temporal intermittent rhythmic delta activity;  LPD=PLED=lateralized periodic discharges; GPD=generalized periodic discharges; BIPDs =bilateral independent periodic discharges; Mf=multifocal; SIRPDs=stimulus induced rhythmic, periodic, or ictal appearing discharges; BIRDs=brief potentially ictal rhythmic discharges >4 Hz, lasting .5-10s; PFA (paroxysmal bursts >13 Hz or =8 Hz <10s).  Modifiers: +F=with fast component; +S=with spike component; +R=with rhythmic component.  S-B=burst suppression pattern.  Max=maximal. N1-drowsy; N2-stage II sleep; N3-slow wave sleep. SSS/BETS=small sharp spikes/benign epileptiform transients of sleep. HV=hyperventilation; PS=photic stimulation]  Study Interpretation:  FINDINGS:    Background: Symmetry: symmetric Continuous: continuous PDR: symmetric, well-modulated 7 Hz activity, with amplitude to 40 uV, that attenuated to eye opening.  Low amplitude frontal beta noted in wakefulness. Reactivity: present Voltage: normal, mostly 20-150uV Anterior Posterior Gradient: present Breach: absent  Background Slowing: Generalized slowing: none was present. Focal slowing: none was present.  State Changes:  Stage II sleep transients were not recorded.  Sporadic Epileptiform Discharges:  None  Rhythmic and Periodic Patterns (RPPs): None   Electrographic and Electroclinical seizures: None  Other Clinical Events: None  Activation Procedures:  -Hyperventilation was not performed.   -Photic stimulation was performed and did not elicit any abnormalities.    Artifacts: Intermittent myogenic and movement artifacts were noted.  ECG: The heart rate on single channel ECG was predominantly between 60-80BPM.  EEG Classification / Summary:  Abnormal EEG awake / drowsy / sleep  -background slowing, mild to moderate, generalized  Clinical Impression: Findings indicate:  Mild to moderate nonspecific diffuse or multifocal cerebral dysfunction.  No epileptiform pattern or seizure seen.  ________________________________________  Frederick Banegas MD Epilepsy Fellow , [facility] Department of Neurology, [facility] [employer]  Hilario Wagoner MD PhD Director, Epilepsy Division, [facility] EEG Reading Room Ph#: [phone] Epilepsy Answering Service after 5PM and before 8:30AM: Ph#: [phone]

## 2022-11-22 NOTE — BH CONSULTATION LIAISON ASSESSMENT NOTE - NSBHATTESTAPPAMEND_PSY_A_CORE
I have personally seen and examined this patient. I fully participated in the care of this patient. I have made amendments to the documentation where appropriate and otherwise agree with the history, physical exam, and plan as documented by the QASIM

## 2022-11-22 NOTE — BH CONSULTATION LIAISON ASSESSMENT NOTE - CURRENT MEDICATION
MEDICATIONS  (STANDING):  cefTRIAXone   IVPB 1000 milliGRAM(s) IV Intermittent every 24 hours  donepezil 5 milliGRAM(s) Oral at bedtime  heparin   Injectable 5000 Unit(s) SubCutaneous every 12 hours  levothyroxine 25 MICROGram(s) Oral daily  pantoprazole    Tablet 40 milliGRAM(s) Oral before breakfast  tamsulosin 0.4 milliGRAM(s) Oral at bedtime    MEDICATIONS  (PRN):  acetaminophen     Tablet .. 650 milliGRAM(s) Oral every 6 hours PRN Temp greater or equal to 38C (100.4F), Mild Pain (1 - 3)

## 2022-11-22 NOTE — BH CONSULTATION LIAISON ASSESSMENT NOTE - NSBHATTESTCOMMENTATTENDFT_PSY_A_CORE
Chart reviewed, pt. seen/evaluated with Lyric Woodward NP, I agree with above assessment/plan. Patient AAOX1, pleasantly confused, with impaired attention and impoverished thought process, not agitated, care coordinated with pt.'s wife at bedside. Plan as above.

## 2022-11-22 NOTE — PROGRESS NOTE ADULT - PROBLEM SELECTOR PLAN 1
- pt with episode of syncope, slid from chair to floor without head trauma; found to be hypotensive with EMS  - neuro exam at baseline (AOx1, no focal deficits)  - HDS on arrival  - EKG NSR  - trop neg x2, TSH WNL  - Neuro eval appreciated

## 2022-11-22 NOTE — BH CONSULTATION LIAISON ASSESSMENT NOTE - SUMMARY
Patient is an 86 y/o male w/ dementia (Aox0-1), ?hypothyroid, and BPH s/p recent procedure 1 week ago at OSH was brought to the ER for loss of consciousness for about 1 minute while seated at home today. Found w/ a positive UA. Patient comes from home, lives with wife and son. Patient with no psychiatric hx, no inpatient admission, no hx of SA, no substance abuse. At home patient without aggression or violence. Wife is at bedside providing details ( to ensure full understanding PCA translated in Micronesian creole). Wife talks about the families Roman Catholic and roosevelt often when talking about substance use and SI - stating this I against their Roman Catholic. no acute safety concerns. Psychiatry called for agitation, dementia.     PLAN  - no standing psychotropic medication at this time  - antipsychotics can only be given if qtc < 500   - PRN for agitation: haldol 1mg q6hs ( patient has tolerated haldol this admission with no noted s/e) wife aware of use of PRN medication  - defer level of observation to primary team  - tx infection  - Delirum work up: b12, folate, tsh  - In order to enhance patient's overall well-being and clinical course, please try avoiding benzodiazepines, anticholinergics, and antihistamines (Can cause worsening confusion/delirium). Additionally, continue reorientation, supportive care, maintaining regular sleep/wake cycle, and optimizing nutritional/medical factors.  Patient is an 88 y/o male w/ dementia (Aox0-1), ?hypothyroid, and BPH s/p recent procedure 1 week ago at OSH was brought to the ER for loss of consciousness for about 1 minute while seated at home today. Found w/ a positive UA. Patient comes from home, lives with wife and son. Patient with no psychiatric hx, no inpatient admission, no hx of SA, no substance abuse. At home patient without aggression or violence. Wife is at bedside providing details ( to ensure full understanding staff translated in Mosotho creole). Wife talks about the families Bahai and roosevelt often when talking about substance use and SI - stating this is against their Bahai. no acute psychiatric safety concerns. Psychiatry called for agitation, dementia.     PLAN  - no standing psychotropic medication at this time  - antipsychotics can only be given if qtc < 500   - PRN for agitation: haldol 1mg q6hs PO/IM ( patient has tolerated multiple doses of haldol this admission with no noted s/e) wife aware of use of PRN medication, indication/risk/benefits, BBW discussed , in agreement  - defer level of observation to primary team  - tx infection  - Delirum work up: b12, folate, tsh  - In order to enhance patient's overall well-being and clinical course, please try avoiding benzodiazepines, anticholinergics, and antihistamines (Can cause worsening confusion/delirium). Additionally, continue reorientation, supportive care, maintaining regular sleep/wake cycle, and optimizing nutritional/medical factors.

## 2022-11-22 NOTE — BH CONSULTATION LIAISON ASSESSMENT NOTE - NSBHCHARTREVIEWLAB_PSY_A_CORE FT
9.6    4.84  )-----------( 345      ( 22 Nov 2022 07:48 )             29.8   11-22    139  |  104  |  12  ----------------------------<  88  3.8   |  25  |  0.88    Ca    9.3      22 Nov 2022 07:48  Phos  3.5     11-22  Mg     2.10     11-22

## 2022-11-22 NOTE — BH CONSULTATION LIAISON ASSESSMENT NOTE - HPI (INCLUDE ILLNESS QUALITY, SEVERITY, DURATION, TIMING, CONTEXT, MODIFYING FACTORS, ASSOCIATED SIGNS AND SYMPTOMS)
Patient is an 88 y/o male w/ dementia (Aox0-1), ?hypothyroid, and BPH s/p recent procedure 1 week ago at OSH was brought to the ER for loss of consciousness for about 1 minute while seated at home today. Found w/ a positive UA. Patient comes from home, lives with wife and son. Patient with no psychiatric hx, no inpatient admission, no hx of SA, no substance abuse. At home patient without aggression or violence. Wife is at bedside providing details ( to ensure full understanding PCA translated in Guyanese creole). Wife talks about the families Mu-ism and roosevelt often when talking about substance use and SI - stating this I against their Mu-ism. no acute safety concerns. Psychiatry called for agitation, dementia.     Patient was seen and assessed at bedside. patient is alert, oriented to self only. patient is calm, allowing for care (EEG being placed at time of interview). Last PRN given 11/21 00:00. Patient is loose on exam, no rigidity, no cogwheeling. no SI or HI elicited.    Patient is an 86 y/o male w/ dementia (Aox0-1), ?hypothyroid, and BPH s/p recent procedure 1 week ago at OSH was brought to the ER for loss of consciousness for about 1 minute while seated at home today. Found w/ a positive UA. Patient comes from home, lives with wife and son. Patient with no psychiatric hx, no inpatient admission, no hx of SA, no substance abuse. At home patient without aggression or violence, has h/o dementia and at baseline he is AAOX0-1. Wife is at bedside providing details ( to ensure full understanding staff translated in Hungarian creole). Wife talks about the families Uatsdin and roosevelt often when talking about substance use and SI - stating this is against their Uatsdin. no acute safety concerns. Psychiatry called for agitation, dementia. Pt. received Haldol 5mg IM x2 on 11/19, versed 2mg IV x1 on 11/19, Haldol 5mg IM x1 on 11/20 and Haldol 5mg IM x1 on 11/21.      Patient was seen and assessed at bedside. patient is alert, oriented to self only. patient is calm, allowing for care (EEG being placed at time of interview). Last PRN given 11/21 00:00. Patient is loose on exam, no rigidity, no cogwheeling. no SI or HI elicited.

## 2022-11-22 NOTE — CONSULT NOTE ADULT - SUBJECTIVE AND OBJECTIVE BOX
Neurology Consult    Reason for Consult: Patient is a 87y old  Male who presents with a chief complaint of syncope, UTI (21 Nov 2022 16:02)      HPI:  Pt is an 86 y/o male w/ dementia (Aox0-1), hypothyroid, and BPH s/p recent procedure 1 week ago at OSH was brought to the ER for loss of consciousness for about 1 minute while seated at home today. History obtained from daughter at bedside as pt unable to give history. He was his usual self, able to eat with assistance and at times confused but he all of a sudden lost consciousness, unclear hx of head strike, daughter doesn't think so but no shaking of limbs or urinary/bowel incontinence. Pt did have some abd pain which resolved when he woke up but denied having any other complaint. No new meds reportedly but daughter unsure what his meds are as the brother is the one who handles meds and is on vacation currently. No reported complications with recent procedure. Pt denies any complaints on my evaluation but did not answer all questions. In the ER pt was given fluids and ceftriaxone. pt also given haldol and versed for agitation.  (20 Nov 2022 01:15)       PAST MEDICAL & SURGICAL HISTORY:  Enlarged prostate      Dementia      S/P TURP (transurethral resection of prostate)          Allergies: Allergies    No Known Allergies    Intolerances        Social History: Denies toxic habits including tobacco, ETOH or illicit drugs.    Family History: FAMILY HISTORY:  No significant family history    . No family history of strokes    Medications: MEDICATIONS  (STANDING):  cefTRIAXone   IVPB 1000 milliGRAM(s) IV Intermittent every 24 hours  donepezil 5 milliGRAM(s) Oral at bedtime  heparin   Injectable 5000 Unit(s) SubCutaneous every 12 hours  levothyroxine 25 MICROGram(s) Oral daily  pantoprazole    Tablet 40 milliGRAM(s) Oral before breakfast  tamsulosin 0.4 milliGRAM(s) Oral at bedtime    MEDICATIONS  (PRN):  acetaminophen     Tablet .. 650 milliGRAM(s) Oral every 6 hours PRN Temp greater or equal to 38C (100.4F), Mild Pain (1 - 3)      Review of Systems:  unable given mental status     Vitals:  Vital Signs Last 24 Hrs  T(C): 36.7 (22 Nov 2022 05:58), Max: 36.7 (21 Nov 2022 21:01)  T(F): 98 (22 Nov 2022 05:58), Max: 98 (21 Nov 2022 21:01)  HR: 76 (22 Nov 2022 05:58) (76 - 85)  BP: 100/77 (22 Nov 2022 05:58) (100/77 - 117/54)  BP(mean): --  RR: 17 (22 Nov 2022 05:58) (17 - 18)  SpO2: 100% (22 Nov 2022 05:58) (98% - 100%)    Parameters below as of 22 Nov 2022 05:58  Patient On (Oxygen Delivery Method): room air      Orthostatic VS    11-22-22 @ 03:10  Lying BP: 138/68 HR: 76   Sitting BP: 137/75 HR: 83  Standing BP: --/-- HR: --  Site: upper left arm   Mode: electronic    General Exam:   General Appearance: Appropriately dressed and in no acute distress       Head: Normocephalic, atraumatic and no dysmorphic features  Ear, Nose, and Throat: Moist mucous membranes  CVS: S1S2+  Resp: No SOB, no wheeze or rhonchi  GI: soft NT/ND  Extremities: No edema or cyanosis  Skin: No bruises or rashes     Neurological Exam:  Mental Status: Awake, alert and oriented x 1.  Able to follow simple verbal commands. Able to name and repeat. fluent speech. No obvious aphasia or dysarthria noted.   Cranial Nerves: PERRL, EOMI, VFFC, sensation V1-V3 intact,  no obvious facial asymmetry, equal elevation of palate, scm/trap 5/5, tongue is midline on protrusion. no obvious papilledema on fundoscopic exam. hearing is grossly intact.   Motor: CARMICHAEL at least 3-4/5   Sensation: Intact to light touch and pinprick throughout. no right/left confusion. no extinction to tactile on DSS. Romberg was negative.   Reflexes: 1+ throughout at biceps, brachioradialis, triceps, patellars and ankles bilaterally and equal. No clonus. R toe and L toe were both downgoing.  Coordination: No dysmetria on FNF    Gait: deferred     Data/Labs/Imaging which I personally reviewed.     Labs:     CBC Full  -  ( 22 Nov 2022 07:48 )  WBC Count : 4.84 K/uL  RBC Count : 3.09 M/uL  Hemoglobin : 9.6 g/dL  Hematocrit : 29.8 %  Platelet Count - Automated : 345 K/uL  Mean Cell Volume : 96.4 fL  Mean Cell Hemoglobin : 31.1 pg  Mean Cell Hemoglobin Concentration : 32.2 gm/dL  Auto Neutrophil # : 2.61 K/uL  Auto Lymphocyte # : 1.49 K/uL  Auto Monocyte # : 0.53 K/uL  Auto Eosinophil # : 0.18 K/uL  Auto Basophil # : 0.02 K/uL  Auto Neutrophil % : 53.9 %  Auto Lymphocyte % : 30.8 %  Auto Monocyte % : 11.0 %  Auto Eosinophil % : 3.7 %  Auto Basophil % : 0.4 %    11-21    141  |  107  |  16  ----------------------------<  114<H>  3.9   |  27  |  0.89    Ca    9.5      21 Nov 2022 12:00  Phos  3.1     11-21  Mg     2.10     11-21    TPro  7.1  /  Alb  3.7  /  TBili  0.2  /  DBili  <0.2  /  AST  20  /  ALT  11  /  AlkPhos  65  11-20    LIVER FUNCTIONS - ( 20 Nov 2022 10:41 )  Alb: 3.7 g/dL / Pro: 7.1 g/dL / ALK PHOS: 65 U/L / ALT: 11 U/L / AST: 20 U/L / GGT: x           PT/INR - ( 21 Nov 2022 12:00 )   PT: 13.5 sec;   INR: 1.16 ratio         < from: CT Head No Cont (11.21.22 @ 04:06) >    ACC: [id number] EXAM:  CT BRAIN                          PROCEDURE DATE:  11/21/2022          INTERPRETATION:  INDICATIONS:  syncope    TECHNIQUE:  Serial axial images were obtained from the skull base to the   vertex without intravenous contrast. Sagittal and Coronal reformats were   performed    COMPARISON EXAMINATION: 6/23/2019    FINDINGS:  VENTRICLES AND SULCI:  Age-appropriate involutional changes as seen on   the prior  INTRA-AXIAL: No evidence of intracranial hemorrhage. No evidence of large   vessel occlusion  VISUALIZED SINUSES:  Bilateral maxillary sinus mucosal thickening  VISUALIZED MASTOIDS: Under pneumatization of the left mastoid air cells  CALVARIUM:  Normal.  MISCELLANEOUS: Study is somewhat motion degraded    IMPRESSION:  Age-appropriate involutional changes as seen on the prior.   No evidence of large vessel occlusion no intracranial hemorrhage.    --- End of Report ---            YUDITH SUAREZ MD; Attending Radiologist  This document has been electronically signed. Nov 21 2022  9:38AM    < end of copied text >

## 2022-11-22 NOTE — BH CONSULTATION LIAISON ASSESSMENT NOTE - NSBHCHARTREVIEWVS_PSY_A_CORE FT
Vital Signs Last 24 Hrs  T(C): 36.5 (22 Nov 2022 12:15), Max: 36.7 (21 Nov 2022 21:01)  T(F): 97.7 (22 Nov 2022 12:15), Max: 98 (21 Nov 2022 21:01)  HR: 85 (22 Nov 2022 12:15) (76 - 85)  BP: 97/62 (22 Nov 2022 12:15) (97/62 - 117/54)  BP(mean): --  RR: 17 (22 Nov 2022 12:15) (17 - 18)  SpO2: 98% (22 Nov 2022 12:15) (98% - 100%)    Parameters below as of 22 Nov 2022 12:15  Patient On (Oxygen Delivery Method): room air

## 2022-11-22 NOTE — CONSULT NOTE ADULT - ASSESSMENT
88 y/o [place] male w/ dementia (Aox0-1), hypothyroid, and BPH s/p recent procedure 1 week ago at OSH was brought to the ER for loss of consciousness for about 1 minute while seated at home.  + LOC, no shaking of limbs or urinary/bowel incontinence.  agitated in ED got haldol.   CTH chornic changes.   CT chest: no PE     Impression:   1) syncope in setting of UTI. doubt seziure  2) dementia, baseline     - treatment of infection per primary team, on CTX   - for dementia, c/w donepezil 5mg QHS.   - check b12 (WNL), RPR, TSH (WNL)  - f/u urine cx  - cardiacology recs appreciated.   - check orthostatics   - check Carotid doppler   - psych f/u for agitation.  now on 1:1.   - seroquel or zyprexa preferred over haldol.  make sure QTC < 500   - no need for MRI brain at this time   - rEEG in or outpatient   - Hemoglobin A1c and lipid panel  - TTE (refused)   - telemetry  - PT/OT  - check FS, glucose control <180  - GI/DVT ppx  - Counseling on diet, exercise, and medication adherence was done  - Counseling on smoking cessation and alcohol consumption offered when appropriate.  - Pain assessed and judicious use of narcotics when appropriate was discussed.    - Stroke education given when appropriate.  - Importance of fall prevention discussed.   - Differential diagnosis and plan of care discussed with patient and/or family and primary team  - Thank you for allowing me to participate in the care of this patient. Call with questions.     Fredy Butler MD  Vascular Neurologu  Office: [phone]

## 2022-11-23 ENCOUNTER — TRANSCRIPTION ENCOUNTER (OUTPATIENT)
Age: 87
End: 2022-11-23

## 2022-11-23 VITALS
DIASTOLIC BLOOD PRESSURE: 53 MMHG | RESPIRATION RATE: 18 BRPM | HEART RATE: 94 BPM | OXYGEN SATURATION: 100 % | TEMPERATURE: 98 F | SYSTOLIC BLOOD PRESSURE: 110 MMHG

## 2022-11-23 RX ORDER — CEPHALEXIN 500 MG
1 CAPSULE ORAL
Qty: 10 | Refills: 0
Start: 2022-11-23 | End: 2022-11-27

## 2022-11-23 RX ADMIN — CEFTRIAXONE 100 MILLIGRAM(S): 500 INJECTION, POWDER, FOR SOLUTION INTRAMUSCULAR; INTRAVENOUS at 01:52

## 2022-11-23 RX ADMIN — Medication 25 MICROGRAM(S): at 05:18

## 2022-11-23 RX ADMIN — HEPARIN SODIUM 5000 UNIT(S): 5000 INJECTION INTRAVENOUS; SUBCUTANEOUS at 17:50

## 2022-11-23 RX ADMIN — HEPARIN SODIUM 5000 UNIT(S): 5000 INJECTION INTRAVENOUS; SUBCUTANEOUS at 05:17

## 2022-11-23 RX ADMIN — PANTOPRAZOLE SODIUM 40 MILLIGRAM(S): 20 TABLET, DELAYED RELEASE ORAL at 05:17

## 2022-11-23 NOTE — PROVIDER CONTACT NOTE (OTHER) - REASON
Patient is agitated, standing on the stretcher and getting combative with the staff.
Patient ripping tele leads off
Patient refusing blood work
Patient's diaper is soaked with blood

## 2022-11-23 NOTE — DISCHARGE NOTE PROVIDER - CARE PROVIDER_API CALL
[facility]  Internal Medicine  [address] [place] [address]  Phone: [phone]  Fax: [phone]  Follow Up Time:

## 2022-11-23 NOTE — PROVIDER CONTACT NOTE (OTHER) - BACKGROUND
87 year old male admitted for syncope and collapse
Admitted for syncope. Hx of dementia
87 year old male admitted for syncope and collapse
Admitted for syncopal episode and +UTI

## 2022-11-23 NOTE — PROGRESS NOTE ADULT - PROBLEM SELECTOR PLAN 10
- F: none  - E: replete K<4, Mg<2  - N: DASH/TLC  - D: hep sq  - G: none    code: full  dispo: pending medical optimization, PT eval
- F: none  - E: replete K<4, Mg<2  - N: DASH/TLC  - D: hep sq  - G: none    code: full      D/C planing
- F: none  - E: replete K<4, Mg<2  - N: DASH/TLC  - D: hep sq  - G: none    code: full  dispo: pending medical optimization, PT eval
- F: none  - E: replete K<4, Mg<2  - N: DASH/TLC  - D: hep sq  - G: none    code: full  dispo: pending medical optimization, PT eval

## 2022-11-23 NOTE — PROGRESS NOTE ADULT - SUBJECTIVE AND OBJECTIVE BOX
Neurology Progress Note    S: Patient seen and examined. No new events overnight. in observation room but better     Medication:  acetaminophen     Tablet .. 650 milliGRAM(s) Oral every 6 hours PRN  cefTRIAXone   IVPB 1000 milliGRAM(s) IV Intermittent every 24 hours  donepezil 5 milliGRAM(s) Oral at bedtime  haloperidol     Tablet 1 milliGRAM(s) Oral every 6 hours PRN  haloperidol    Injectable 1 milliGRAM(s) IntraMuscular every 6 hours PRN  heparin   Injectable 5000 Unit(s) SubCutaneous every 12 hours  levothyroxine 25 MICROGram(s) Oral daily  pantoprazole    Tablet 40 milliGRAM(s) Oral before breakfast  tamsulosin 0.4 milliGRAM(s) Oral at bedtime      Vitals:  Vital Signs Last 24 Hrs  T(C): 36.7 (23 Nov 2022 05:14), Max: 36.7 (23 Nov 2022 05:14)  T(F): 98 (23 Nov 2022 05:14), Max: 98 (23 Nov 2022 05:14)  HR: 71 (23 Nov 2022 05:14) (71 - 98)  BP: 102/47 (23 Nov 2022 05:14) (97/62 - 124/60)  BP(mean): --  RR: 16 (23 Nov 2022 05:14) (16 - 17)  SpO2: 98% (23 Nov 2022 05:14) (95% - 100%)    Parameters below as of 22 Nov 2022 21:30  Patient On (Oxygen Delivery Method): room air        General Exam:   General Appearance: Appropriately dressed and in no acute distress       Head: Normocephalic, atraumatic and no dysmorphic features  Ear, Nose, and Throat: Moist mucous membranes  CVS: S1S2+  Resp: No SOB, no wheeze or rhonchi  Abd: soft NTND  Extremities: No edema, no cyanosis  Skin: No bruises, no rashes     Neurological Exam:  Mental Status: Awake, alert and oriented x 1.  Able to follow simple verbal commands. Able to name and repeat. fluent speech. No obvious aphasia or dysarthria noted.   Cranial Nerves: PERRL, EOMI, VFFC, sensation V1-V3 intact,  no obvious facial asymmetry, equal elevation of palate, scm/trap 5/5, tongue is midline on protrusion. no obvious papilledema on fundoscopic exam. hearing is grossly intact.   Motor: CARMICHAEL at least 3-4/5   Sensation: Intact to light touch and pinprick throughout. no right/left confusion. no extinction to tactile on DSS. Romberg was negative.   Reflexes: 1+ throughout at biceps, brachioradialis, triceps, patellars and ankles bilaterally and equal. No clonus. R toe and L toe were both downgoing.  Coordination: No dysmetria on FNF    Gait: deferred     I personally reviewed the below data/images/labs:      CBC Full  -  ( 22 Nov 2022 07:48 )  WBC Count : 4.84 K/uL  RBC Count : 3.09 M/uL  Hemoglobin : 9.6 g/dL  Hematocrit : 29.8 %  Platelet Count - Automated : 345 K/uL  Mean Cell Volume : 96.4 fL  Mean Cell Hemoglobin : 31.1 pg  Mean Cell Hemoglobin Concentration : 32.2 gm/dL  Auto Neutrophil # : 2.61 K/uL  Auto Lymphocyte # : 1.49 K/uL  Auto Monocyte # : 0.53 K/uL  Auto Eosinophil # : 0.18 K/uL  Auto Basophil # : 0.02 K/uL  Auto Neutrophil % : 53.9 %  Auto Lymphocyte % : 30.8 %  Auto Monocyte % : 11.0 %  Auto Eosinophil % : 3.7 %  Auto Basophil % : 0.4 %    11-22    139  |  104  |  12  ----------------------------<  88  3.8   |  25  |  0.88    Ca    9.3      22 Nov 2022 07:48  Phos  3.5     11-22  Mg     2.10     11-22        PT/INR - ( 21 Nov 2022 12:00 )   PT: 13.5 sec;   INR: 1.16 ratio             < from: CT Head No Cont (11.21.22 @ 04:06) >    ACC: [id number] EXAM:  CT BRAIN                          PROCEDURE DATE:  11/21/2022          INTERPRETATION:  INDICATIONS:  syncope    TECHNIQUE:  Serial axial images were obtained from the skull base to the   vertex without intravenous contrast. Sagittal and Coronal reformats were   performed    COMPARISON EXAMINATION: 6/23/2019    FINDINGS:  VENTRICLES AND SULCI:  Age-appropriate involutional changes as seen on   the prior  INTRA-AXIAL: No evidence of intracranial hemorrhage. No evidence of large   vessel occlusion  VISUALIZED SINUSES:  Bilateral maxillary sinus mucosal thickening  VISUALIZED MASTOIDS: Under pneumatization of the left mastoid air cells  CALVARIUM:  Normal.  MISCELLANEOUS: Study is somewhat motion degraded    IMPRESSION:  Age-appropriate involutional changes as seen on the prior.   No evidence of large vessel occlusion no intracranial hemorrhage.    --- End of Report ---            YUDITH SUAREZ MD; Attending Radiologist  This document has been electronically signed. Nov 21 2022  9:38AM    < end of copied text >           Clinical Impression:  Findings indicate:    Mild to moderate nonspecific diffuse or multifocal cerebral dysfunction.   No epileptiform pattern or seizure seen.    CD: ------------------------------------------------------------------------  Summary/Impressions:  Mild intimal thickening noted in the proximal right and  left internal carotid arteries.  No hemodynamically  significant stenosis visualized.  ------------------------------------------------------------------------
DATE OF SERVICE: 11-22-22 @ 14:40    Patient is a 87y old  Male who presents with a chief complaint of syncope, UTI (22 Nov 2022 08:14)      INTERVAL HISTORY: No events noted.     REVIEW OF SYSTEMS: Unable to participate in ROS d/t AMS  CONSTITUTIONAL: No weakness  EYES/ENT: No visual changes;  No throat pain   NECK: No pain or stiffness  RESPIRATORY: No cough, wheezing; No shortness of breath  CARDIOVASCULAR: No chest pain or palpitations  GASTROINTESTINAL: No abdominal  pain. No nausea, vomiting, or hematemesis  GENITOURINARY: No dysuria, frequency or hematuria  NEUROLOGICAL: No stroke like symptoms  SKIN: No rashes    TELEMETRY Personally reviewed:  	  MEDICATIONS:        PHYSICAL EXAM:  T(C): 36.5 (11-22-22 @ 12:15), Max: 36.7 (11-21-22 @ 21:01)  HR: 85 (11-22-22 @ 12:15) (76 - 85)  BP: 97/62 (11-22-22 @ 12:15) (97/62 - 117/54)  RR: 17 (11-22-22 @ 12:15) (17 - 18)  SpO2: 98% (11-22-22 @ 12:15) (98% - 100%)  Wt(kg): --  I&O's Summary        Appearance: In no distress	  HEENT:    PERRL, EOMI	  Cardiovascular:  S1 S2, No JVD  Respiratory: Lungs clear to auscultation	  Gastrointestinal:  Soft, Non-tender, + BS	  Vascularature:  No edema of LE  Psychiatric: Appropriate affect   Neuro: no acute focal deficits                               9.6    4.84  )-----------( 345      ( 22 Nov 2022 07:48 )             29.8     11-22    139  |  104  |  12  ----------------------------<  88  3.8   |  25  |  0.88    Ca    9.3      22 Nov 2022 07:48  Phos  3.5     11-22  Mg     2.10     11-22          Labs personally reviewed   < from: Transthoracic Echocardiogram (06.24.19 @ 13:50) >  CONCLUSIONS:  1. Mitral annular calcification, otherwise normal mitral  valve. Minimal mitral regurgitation.  2. Normal left ventricular internal dimensions and wall  thicknesses.  3. Normal left ventricular systolic function. No segmental  wall motion abnormalities.  4. Normal right ventricular size and function.    < end of copied text >      ASSESSMENT/PLAN: 	    Pt is an 86 yo M with PMH dementia (AOx1, self) and BPH (s/p TURP) p/w syncopal episode, abd pain, and poor PO intake c/f FTT. Also with c/o hematuria, for which he has been following with urology outpt. Found to have concentrated UA with hematuria, mild lactic acidosis that has resolved, and pending orthostatics / renal US / TTE. PT consulted and pending evaluation.     1. Syncope  - EKG NSR  - Carotid US with no significant hemodynamic stenosis  - Patient refused TTE. Prior TTE from 2019 unremarkable.  - CT Head negative for acute hemorrhage, shift or infarct.   - Monitor on tele  - Neuro consult appreciated  - Check orthos if patient able  - Syncope likely d/t hypotension secondary to acute UTI.   - c/w IV Abx. Follow up UCx.  - Consider OP holter monitor.     2. UTI  - + UA  - c/w IV Ceftriaxone  - follow up Urine Cultures  - Monitor for fevers, currently no leukocytosis    3. BPH  - c/w Tamsulosin    4. DVT PPx  - c/w SQ heparin      Radha Gonzales, AG-NP   Felix Mc DO [facility]  Cardiovascular Medicine  [address] [place] [address], [address]  Available through call or text on [employer] TEAM[employer]  Office: [phone]  
DATE OF SERVICE: 11-23-22 @ 14:00    Patient is a 87y old  Male who presents with a chief complaint of syncope, UTI (23 Nov 2022 12:17)      INTERVAL HISTORY: No events noted.     REVIEW OF SYSTEMS: Unable to obtain ROS d/t baseline dementia  CONSTITUTIONAL: No weakness  EYES/ENT: No visual changes;  No throat pain   NECK: No pain or stiffness  RESPIRATORY: No cough, wheezing; No shortness of breath  CARDIOVASCULAR: No chest pain or palpitations  GASTROINTESTINAL: No abdominal  pain. No nausea, vomiting, or hematemesis  GENITOURINARY: No dysuria, frequency or hematuria  NEUROLOGICAL: No stroke like symptoms  SKIN: No rashes    TELEMETRY Personally reviewed:  	  MEDICATIONS:        PHYSICAL EXAM:  T(C): 36.7 (11-23-22 @ 10:33), Max: 36.7 (11-23-22 @ 05:14)  HR: 96 (11-23-22 @ 10:33) (71 - 98)  BP: 112/67 (11-23-22 @ 10:33) (102/47 - 124/60)  RR: 16 (11-23-22 @ 10:33) (16 - 17)  SpO2: 94% (11-23-22 @ 10:33) (94% - 100%)  Wt(kg): --  I&O's Summary        Appearance: In no distress	  HEENT:    PERRL, EOMI	  Cardiovascular:  S1 S2, No JVD  Respiratory: Lungs clear to auscultation	  Gastrointestinal:  Soft, Non-tender, + BS	  Vascularature:  No edema of LE  Psychiatric: Appropriate affect   Neuro: no acute focal deficits                               9.6    4.84  )-----------( 345      ( 22 Nov 2022 07:48 )             29.8     11-22    139  |  104  |  12  ----------------------------<  88  3.8   |  25  |  0.88    Ca    9.3      22 Nov 2022 07:48  Phos  3.5     11-22  Mg     2.10     11-22          Labs personally reviewed      ASSESSMENT/PLAN: 	    Pt is an 88 yo M with PMH dementia (AOx1, self) and BPH (s/p TURP) p/w syncopal episode, abd pain, and poor PO intake c/f FTT. Also with c/o hematuria, for which he has been following with urology outpt. Found to have concentrated UA with hematuria, mild lactic acidosis that has resolved, and pending orthostatics / renal US / TTE. PT consulted and pending evaluation.     1. Syncope  - EKG NSR  - Carotid US with no significant hemodynamic stenosis  - Patient refused TTE. Prior TTE from 2019 unremarkable.  - CT Head negative for acute hemorrhage, shift or infarct.   - Monitor on tele  - Neuro consult appreciated  - Check orthos if patient able  - Syncope likely d/t hypotension secondary to acute UTI.   - c/w IV Abx. Follow up UCx.  - Consider OP holter monitor.     2. UTI  - + UA  - c/w IV Ceftriaxone  - follow up Urine Cultures  - Monitor for fevers, currently no leukocytosis    3. BPH  - c/w Tamsulosin    4. DVT PPx  - c/w SQ heparin          LILY Stern-NP   Felix Mc DO [facility]  Cardiovascular Medicine  [address], [address]  Available through call or text on [employer] TEAMs  Office: [phone]  
Name of Patient : RAMIRO KHAN  MRN: [id number]  Date of visit: 11-22-22       Subjective: Patient seen and examined. No new events except as noted.   doing better     REVIEW OF SYSTEMS:    CONSTITUTIONAL: No weakness, fevers or chills  EYES/ENT: No visual changes;  No vertigo or throat pain   NECK: No pain or stiffness  RESPIRATORY: No cough, wheezing, hemoptysis; No shortness of breath  CARDIOVASCULAR: No chest pain or palpitations  GASTROINTESTINAL: No abdominal or epigastric pain. No nausea, vomiting, or hematemesis; No diarrhea or constipation. No melena or hematochezia.  GENITOURINARY: No dysuria, frequency or hematuria  NEUROLOGICAL: No numbness or weakness  SKIN: No itching, burning, rashes, or lesions   All other review of systems is negative unless indicated above.    MEDICATIONS:  MEDICATIONS  (STANDING):  cefTRIAXone   IVPB 1000 milliGRAM(s) IV Intermittent every 24 hours  donepezil 5 milliGRAM(s) Oral at bedtime  heparin   Injectable 5000 Unit(s) SubCutaneous every 12 hours  levothyroxine 25 MICROGram(s) Oral daily  pantoprazole    Tablet 40 milliGRAM(s) Oral before breakfast  tamsulosin 0.4 milliGRAM(s) Oral at bedtime      PHYSICAL EXAM:  T(C): 36.6 (11-22-22 @ 17:35), Max: 36.7 (11-21-22 @ 21:01)  HR: 96 (11-22-22 @ 17:35) (76 - 96)  BP: 111/64 (11-22-22 @ 17:35) (97/62 - 111/64)  RR: 17 (11-22-22 @ 17:35) (17 - 18)  SpO2: 100% (11-22-22 @ 17:35) (98% - 100%)  Wt(kg): --  I&O's Summary        Appearance: Normal	  HEENT:  PERRLA   Lymphatic: No lymphadenopathy   Cardiovascular: Normal S1 S2, no JVD  Respiratory: normal effort , clear  Gastrointestinal:  Soft, Non-tender  Skin: No rashes,  warm to touch  Psychiatry:  Mood & affect appropriate  Musculuskeletal: No edema      All labs, Imaging and EKGs personally reviewed                             9.6    4.84  )-----------( 345      ( 22 Nov 2022 07:48 )             29.8               11-22    139  |  104  |  12  ----------------------------<  88  3.8   |  25  |  0.88    Ca    9.3      22 Nov 2022 07:48  Phos  3.5     11-22  Mg     2.10     11-22      PT/INR - ( 21 Nov 2022 12:00 )   PT: 13.5 sec;   INR: 1.16 ratio                                              
Name of Patient : RAMIRO KHAN  MRN: [id number]  Date of visit: 11-23-22       Subjective: Patient seen and examined. No new events except as noted.   doing okay   less confused  seen by psych       REVIEW OF SYSTEMS:    CONSTITUTIONAL: No weakness, fevers or chills  EYES/ENT: No visual changes;  No vertigo or throat pain   NECK: No pain or stiffness  RESPIRATORY: No cough, wheezing, hemoptysis; No shortness of breath  CARDIOVASCULAR: No chest pain or palpitations  GASTROINTESTINAL: No abdominal or epigastric pain. No nausea, vomiting, or hematemesis; No diarrhea or constipation. No melena or hematochezia.  GENITOURINARY: No dysuria, frequency or hematuria  NEUROLOGICAL: No numbness or weakness  SKIN: No itching, burning, rashes, or lesions   All other review of systems is negative unless indicated above.    MEDICATIONS:  MEDICATIONS  (STANDING):  cefTRIAXone   IVPB 1000 milliGRAM(s) IV Intermittent every 24 hours  donepezil 5 milliGRAM(s) Oral at bedtime  heparin   Injectable 5000 Unit(s) SubCutaneous every 12 hours  levothyroxine 25 MICROGram(s) Oral daily  pantoprazole    Tablet 40 milliGRAM(s) Oral before breakfast  tamsulosin 0.4 milliGRAM(s) Oral at bedtime      PHYSICAL EXAM:  T(C): 36.7 (11-23-22 @ 18:05), Max: 36.7 (11-23-22 @ 05:14)  HR: 94 (11-23-22 @ 18:05) (71 - 98)  BP: 110/53 (11-23-22 @ 18:05) (102/47 - 124/60)  RR: 18 (11-23-22 @ 18:05) (16 - 18)  SpO2: 100% (11-23-22 @ 18:05) (94% - 100%)  Wt(kg): --  I&O's Summary        Appearance: Normal	  HEENT:  PERRLA   Lymphatic: No lymphadenopathy   Cardiovascular: Normal S1 S2, no JVD  Respiratory: normal effort , clear  Gastrointestinal:  Soft, Non-tender  Skin: No rashes,  warm to touch  Psychiatry:  Mood & affect appropriate  Musculuskeletal: No edema      All labs, Imaging and EKGs personally reviewed                             9.6    4.84  )-----------( 345      ( 22 Nov 2022 07:48 )             29.8               11-22    139  |  104  |  12  ----------------------------<  88  3.8   |  25  |  0.88    Ca    9.3      22 Nov 2022 07:48  Phos  3.5     11-22  Mg     2.10     11-22                                           
[facility] Department of Hospital Medicine  Yris Brown DO  Available on MS Teams  Pager: [phone]    Patient is a 87y old  Male who presents with a chief complaint of syncope, UTI (20 Nov 2022 12:26)    Subjective:   Pt seen and examined at bedside in no acute distress, sleeping but easily arousable; wife present. Creole  assisted #[id number] Britton. Pt is a poor historian. Wife says pt with c/o abd pain x 2d and poor PO intake. Slumped down in chair and slid to ground c/o lightheadedness. Per EMS, found to be hypotensive to SBP 90s. Wife states pt at baseline is only AOx1 (self) and minimally conversant. Wife and pt both deny BRBPR and state he was having hematuria recently without penile pain. Has been following with urology for this. No other concerns or complaints.     REVIEW OF SYSTEMS:    CONSTITUTIONAL: No weakness, fevers or chills.   EYES/ENT: No visual changes;  No vertigo or throat pain   NECK: No pain or stiffness  RESPIRATORY: No cough, wheezing, hemoptysis; No shortness of breath  CARDIOVASCULAR: No chest pain or palpitations  GASTROINTESTINAL: No abdominal or epigastric pain. No nausea, vomiting, or hematemesis; No diarrhea or constipation. No melena or hematochezia.  GENITOURINARY: No dysuria, frequency or hematuria  NEUROLOGICAL: No numbness or weakness  SKIN: No itching, burning, rashes, or lesions   All other review of systems is negative unless indicated above.    VITAL SIGNS:  T(C): 36.4 (11-20-22 @ 09:45), Max: 36.8 (11-19-22 @ 19:07)  T(F): 97.5 (11-20-22 @ 09:45), Max: 98.3 (11-19-22 @ 19:07)  HR: 73 (11-20-22 @ 09:45) (66 - 85)  BP: 97/60 (11-20-22 @ 09:45) (97/60 - 129/72)  BP(mean): --  RR: 16 (11-20-22 @ 09:45) (15 - 18)  SpO2: 100% (11-20-22 @ 09:45) (99% - 100%)  Wt(kg): --    PHYSICAL EXAM:  Constitutional: thin frail eldelry M; resting comfortably in bed; NAD  Head: NC/AT  Eyes: PERRL, EOMI, anicteric sclera  ENT: no nasal discharge; MMM  Neck: supple; no JVD  Respiratory: CTA B/L; no W/R/R; on RA without respiratory distress  Cardiac: +S1/S2; RRR; no M/R/G  Gastrointestinal: soft, scaphoid abdomen, NT/ND; no rebound or guarding; +BSx4  Extremities: WWP, no clubbing or cyanosis; no peripheral edema  Musculoskeletal: NROM x4; no joint swelling, tenderness or erythema  Vascular: 2+ radial, DP/PT pulses B/L  Dermatologic: skin warm, dry and intact; no rashes, wounds, or scars  Neurologic: AAOx1 (self); CNII-XII grossly intact; speaks incomprehensibly at times  Psychiatric: confused    MEDICATIONS  (STANDING):  cefTRIAXone   IVPB 1000 milliGRAM(s) IV Intermittent every 24 hours  donepezil 5 milliGRAM(s) Oral at bedtime  heparin   Injectable 5000 Unit(s) SubCutaneous every 12 hours  levothyroxine 25 MICROGram(s) Oral daily  pantoprazole    Tablet 40 milliGRAM(s) Oral before breakfast  tamsulosin 0.4 milliGRAM(s) Oral at bedtime    MEDICATIONS  (PRN):  acetaminophen     Tablet .. 650 milliGRAM(s) Oral every 6 hours PRN Temp greater or equal to 38C (100.4F), Mild Pain (1 - 3)    LABS:                        9.7    3.75  )-----------( 345      ( 20 Nov 2022 10:41 )             30.8     11-20    140  |  104  |  16  ----------------------------<  130<H>  4.0   |  25  |  0.87    Ca    9.3      20 Nov 2022 10:41  Phos  2.7     11-20  Mg     2.00     11-20    TPro  7.1  /  Alb  3.7  /  TBili  0.2  /  DBili  <0.2  /  AST  20  /  ALT  11  /  AlkPhos  65  11-20    PT/INR - ( 20 Nov 2022 10:41 )   PT: 13.6 sec;   INR: 1.17 ratio         PTT - ( 19 Nov 2022 17:33 )  PTT:26.9 sec  Urinalysis Basic - ( 19 Nov 2022 21:36 )    Color: Dark Brown / Appearance: Turbid / SG: >1.050 / pH: x  Gluc: x / Ketone: Negative  / Bili: Negative / Urobili: <2 mg/dL   Blood: x / Protein: 300 mg/dL / Nitrite: Negative   Leuk Esterase: Large / RBC: >50 /HPF / WBC >50 /HPF   Sq Epi: x / Non Sq Epi: 1 /HPF / Bacteria: Moderate      CAPILLARY BLOOD GLUCOSE          RADIOLOGY & ADDITIONAL TESTS: Reviewed.  
Name of Patient : RAMIRO KHAN  MRN: [id number]  Date of visit: 11-21-22       Subjective: Patient seen and examined. No new events except as noted.   Doing better  calm         MEDICATIONS:  MEDICATIONS  (STANDING):  donepezil 5 milliGRAM(s) Oral at bedtime  heparin   Injectable 5000 Unit(s) SubCutaneous every 12 hours  levothyroxine 25 MICROGram(s) Oral daily  pantoprazole    Tablet 40 milliGRAM(s) Oral before breakfast  tamsulosin 0.4 milliGRAM(s) Oral at bedtime      PHYSICAL EXAM:  T(C): 36.7 (11-21-22 @ 21:01), Max: 36.7 (11-20-22 @ 23:07)  HR: 83 (11-21-22 @ 21:01) (78 - 85)  BP: 107/51 (11-21-22 @ 21:01) (107/51 - 121/61)  RR: 18 (11-21-22 @ 21:01) (17 - 18)  SpO2: 98% (11-21-22 @ 21:01) (98% - 100%)  Wt(kg): --  I&O's Summary        Appearance: Normal	  HEENT:  PERRLA   Lymphatic: No lymphadenopathy   Cardiovascular: Normal S1 S2, no JVD  Respiratory: normal effort , clear  Gastrointestinal:  Soft, Non-tender  Skin: No rashes,  warm to touch  Psychiatry:  Mood & affect appropriate  Musculuskeletal: No edema      All labs, Imaging and EKGs personally reviewed                           9.8    4.01  )-----------( 331      ( 21 Nov 2022 12:00 )             30.9               11-21    141  |  107  |  16  ----------------------------<  114<H>  3.9   |  27  |  0.89    Ca    9.5      21 Nov 2022 12:00  Phos  3.1     11-21  Mg     2.10     11-21    TPro  7.1  /  Alb  3.7  /  TBili  0.2  /  DBili  <0.2  /  AST  20  /  ALT  11  /  AlkPhos  65  11-20    PT/INR - ( 21 Nov 2022 12:00 )   PT: 13.5 sec;   INR: 1.16 ratio

## 2022-11-23 NOTE — DISCHARGE NOTE PROVIDER - NSDCMRMEDTOKEN_GEN_ALL_CORE_FT
donepezil 23 mg oral tablet: 1 tab(s) orally once a day (at bedtime) (Last dispensed 8/26/22 x 30 day supply).   finasteride 5 mg oral tablet: 1 tab(s) orally once a day (Dispensed on 11/10/22).   Flomax 0.4 mg oral capsule: 1 cap(s) orally once a day  No fill history on file?   latanoprost 0.005% ophthalmic solution: 1 drop(s) in each eye once a day (in the evening) (Dispensed on 10/28/22 x 30 days)  levothyroxine 25 mcg (0.025 mg) oral tablet: 1 tab(s) orally once a day  No fill history on file ?   risperiDONE 0.5 mg oral tablet: 1 tab(s) orally 3 times a day, As Needed - for agitation (Dispensed on 8/26/22 x 30 days).    cephalexin 500 mg oral tablet: 1 tab(s) orally every 12 hours   donepezil 23 mg oral tablet: 1 tab(s) orally once a day (at bedtime) (Last dispensed 8/26/22 x 30 day supply).   finasteride 5 mg oral tablet: 1 tab(s) orally once a day (Dispensed on 11/10/22).   Flomax 0.4 mg oral capsule: 1 cap(s) orally once a day  No fill history on file?   latanoprost 0.005% ophthalmic solution: 1 drop(s) in each eye once a day (in the evening) (Dispensed on 10/28/22 x 30 days)  levothyroxine 25 mcg (0.025 mg) oral tablet: 1 tab(s) orally once a day  No fill history on file ?   risperiDONE 0.5 mg oral tablet: 1 tab(s) orally 3 times a day, As Needed - for agitation (Dispensed on 8/26/22 x 30 days).

## 2022-11-23 NOTE — PROGRESS NOTE ADULT - PROBLEM SELECTOR PLAN 8
- baseline AOx1 (self); currently at baseline  - home med: donepezil 5mg daily  - c/w home med  - PRN zyprexa 2.5mg PO/IM for agitation
- baseline AOx1 (self); currently at baseline  - home med: donepezil 5mg daily  - c/w home med  - PRN zyprexa 2.5mg PO/IM for agitation  - psych eval appreciated

## 2022-11-23 NOTE — PROVIDER CONTACT NOTE (OTHER) - ACTION/TREATMENT ORDERED:
Provider notified. Will reattempt to put leads back on.
As per provider Stephen Cancino (#[id number]) administer 5mg of haldol. RN will continue to monitor.
As per provider Kathrine Mac, obtain labs and vitals. Will come to the bedside to assess the patient. RN will continue to monitor.
Will try again later when patient is calm.

## 2022-11-23 NOTE — PROGRESS NOTE ADULT - PROBLEM SELECTOR PLAN 3
- UA+ LE and WBCs  - s/p CTX 1g in ER  - c/w CTX -- would provide 7-10d course given post-op and male with complex UTI  - can transition to PO as appropriate  - f/u UCx, negative however likely due to recent antibtioics  will D/C on cefpodoxim 200 BID total of 7 days
- UA+ LE and WBCs  - s/p CTX 1g in ER  - c/w CTX -- would provide 7-10d course given post-op and male with complex UTI  - can transition to PO as appropriate  - f/u UCx
- UA+ LE and WBCs  - s/p CTX 1g in ER  - c/w CTX -- would provide 7-10d course given post-op and male with complex UTI  - can transition to PO as appropriate  - f/u UCx, pending
- UA+ LE and WBCs  - s/p CTX 1g in ER  - c/w CTX -- would provide 7-10d course given post-op and male with complex UTI  - can transition to PO as appropriate  - f/u UCx, pending

## 2022-11-23 NOTE — DISCHARGE NOTE PROVIDER - ATTENDING ATTESTATION STATEMENT
Statement Selected I have personally seen and examined the patient. I have collaborated with and supervised the

## 2022-11-23 NOTE — DISCHARGE NOTE PROVIDER - HOSPITAL COURSE
88 y/o male w/ dementia (Aox0-1), ?hypothyroid, and BPH s/p recent procedure 1 week ago at OSH was brought to the ER for loss of consciousness for about 1 minute while seated at home today. Found w/ a positive UA.      Syncope.   - pt with episode of syncope, slid from chair to floor without head trauma; found to be hypotensive with EMS  - neuro exam at baseline (AOx1, no focal deficits)  - HDS on arrival  - EKG NSR  - trop neg x2, TSH WNL  - Neuro eval appreciated, CT head was negative.   - Neuro exams during stay was consistent with his baseline.     Adult failure to thrive.   - pt with c/o generalized weakness and poor PO intake  - likely in setting of dementia and abd pain 2/2 recent TURP  - consider abdominal imaging if worsening  - abd exam benign  - encourage PO intake.    Urinary tract infection.   - UA+ LE and WBCs  - s/p CTX 1g in ER  - c/w CTX -- would provide 7-10d course given post-op and male with complex UTI  - can transition to PO as appropriate  - will complete PO antibiotics at home upon DC.     Hematuria.    - pt c/o hematuria for some time; s/p recent TURP and f/w uro outpt  - UA+ RBCs on arrival  -  renal US- WNL    - outpt uro f/u.    Macrocytic anemia.   - Hb 10.7 with   - can check folate/B12   - known hematuria (pt c/o and +UA)  - trend shows stable during stay   - keep active T&S and transfuse Hb<7.     BPH  - known BPH, on flomax 0.4mg daily and s/p recent TURP  - c/w home med  - outpt uro f/u    On 11/23/22, case was discussed with , patient is medically cleared and optimized for discharge today. All medications were reviewed with attending, and sent to mutually agreed upon pharmacy.

## 2022-11-23 NOTE — PROGRESS NOTE ADULT - PROBLEM SELECTOR PLAN 6
- known BPH, on flomax 0.4mg daily and s/p recent TURP  - c/w home med  - outpt uro f/u  - bladder scan for PVR

## 2022-11-23 NOTE — DISCHARGE NOTE NURSING/CASE MANAGEMENT/SOCIAL WORK - PATIENT PORTAL LINK FT
You can access the FollowMyHealth Patient Portal offered by [facility] by registering at the following website: [email or web address]. By joining [employer]’s FollowMyHealth portal, you will also be able to view your health information using other applications (apps) compatible with our system.

## 2022-11-23 NOTE — PROGRESS NOTE ADULT - PROBLEM SELECTOR PLAN 5
- Hb 10.7 with   - can check folate/B12   - known hematuria (pt c/o and +UA)  - continue to trend  - keep active T&S and transfuse Hb<7
- Hb 10.7 with   - can check folate/B12   - known hematuria (pt c/o and +UA)  - continue to trend  - keep active T&S and transfuse Hb<7
- Hb 10.7 with   - can check folate/B12 in AM  - check 4p CBC  - known hematuria (pt c/o and +UA)  - continue to trend  - keep active T&S and transfuse Hb<7
- Hb 10.7 with   - can check folate/B12   - known hematuria (pt c/o and +UA)  - continue to trend  - keep active T&S and transfuse Hb<7

## 2022-11-23 NOTE — PROGRESS NOTE ADULT - PROBLEM SELECTOR PLAN 4
- pt c/o hematuria for some time; s/p recent TURP and f/w uro outpt  - UA+ RBCs on arrival  - check renal US  - outpt uro f/u
- pt c/o hematuria for some time; s/p recent TURP and f/w uro outpt  - UA+ RBCs on arrival  - check renal US, noted   - outpt uro f/u
- pt c/o hematuria for some time; s/p recent TURP and f/w uro outpt  - UA+ RBCs on arrival  - check renal US  - outpt uro f/u
- pt c/o hematuria for some time; s/p recent TURP and f/w uro outpt  - UA+ RBCs on arrival  - check renal US  - outpt uro f/u

## 2022-11-23 NOTE — DISCHARGE NOTE PROVIDER - DISCHARGE SERVICE FOR PATIENT
PROCEDURE NOTE: Eylea Prefilled Syringe 2mg OS. Diagnosis: Neovascular AMD with Active CNV. Prior to injection, risks/benefits/alternatives discussed including corneal abrasion, infection, loss of vision, hemorrhage, cataract, glaucoma, retinal tears or detachment. A written consent is on file, and the need for today's injection was discussed and the patient is understanding and wishes to proceed. A 30G needle was placed on an Eylea 2mg/0.05ml Pre-filled Syringe. Betadine prep was performed. Topical anesthesia was induced with Alcaine. 4% lidocaine pledge. A lid speculum was used. An intravitreal injection of Eylea was given. Injection site: 3-4 mm from the limbus. The used syringe/needle was transferred to a biohazard container. Lid speculum removed. Mask worn during procedure. Patient tolerated procedure well. Count fingers vision was verified. There were no complications. Patient was given the standard instruction sheet. Reinaldo Villareal on the discharge service for the patient. I have reviewed and made amendments to the documentation where necessary.

## 2022-11-23 NOTE — PROVIDER CONTACT NOTE (OTHER) - ASSESSMENT
Patient ripping tele leads off
Patient A&Ox0-1. Patient agitated and pulling arm away when trying to draw blood.
Patient is A&Ox0, Absence of nonverbal indicators of pain, chest pain, shortness of breath, nausea, vomiting or dizziness.
Patient is A&Ox0, Absence of nonverbal indicators of pain, chest pain, shortness of breath, nausea, vomiting or dizziness.

## 2022-11-23 NOTE — PROGRESS NOTE ADULT - PROBLEM SELECTOR PLAN 2
- pt with c/o generalized weakness and poor PO intake  - likely in setting of dementia and abd pain 2/2 recent TURP  - consider abdominal imaging if worsening  - abd exam benign  - nutrition consult  - encourage PO intake

## 2022-11-23 NOTE — PROGRESS NOTE ADULT - PROBLEM SELECTOR PLAN 7
- CT chest with focal bronchiolitis  - outpt f/u  - no s/s respiratory distress

## 2022-11-23 NOTE — PROVIDER CONTACT NOTE (OTHER) - RECOMMENDATIONS
As per provider Kathrine Mac, obtain labs and vitals. Will come to the bedside to assess the patient. RN will continue to monitor.
As per provider Stephen Cancino (#[id number]) administer 5mg of haldol. RN will continue to monitor.
Notify provider

## 2022-11-23 NOTE — PROGRESS NOTE ADULT - PROBLEM SELECTOR PLAN 9
- home med: synthroid 25mcg   - c/w home med  - TSH WNL

## 2022-11-23 NOTE — PROVIDER CONTACT NOTE (OTHER) - SITUATION
Patient refusing blood work
Patient ripping tele leads off consistently
Patient's diaper is soaked with blood
Patient is agitated, standing on the stretcher and getting combative with the staff.

## 2023-06-14 NOTE — PHYSICAL THERAPY INITIAL EVALUATION ADULT - LIVES WITH, PROFILE
spouse Opzelura Pregnancy And Lactation Text: There is insufficient data to evaluate drug-associated risk for major birth defects, miscarriage, or other adverse maternal or fetal outcomes.  There is a pregnancy registry that monitors pregnancy outcomes in pregnant persons exposed to the medication during pregnancy.  It is unknown if this medication is excreted in breast milk.  Do not breastfeed during treatment and for about 4 weeks after the last dose.

## 2024-07-18 NOTE — PHYSICAL THERAPY INITIAL EVALUATION ADULT - ASR WT BEARING STATUS EVAL
PT Evaluation     Today's date: 2024  Patient name: Joe Perez  : 1980  MRN: [id number]  Referring provider: Self, Referral  Dx:   Encounter Diagnosis     ICD-10-CM    1. Lumbar back pain with radiculopathy affecting left lower extremity  M54.16 Ambulatory Referral to Physical Therapy                     Assessment  Impairments: abnormal gait, abnormal or restricted ROM, activity intolerance, impaired physical strength, lacks appropriate home exercise program, pain with function, poor posture  and poor body mechanics  Symptom irritability: moderate    Assessment details: Joe Perez is a 43 y.o. male who presents with pain, decreased strength, decreased ROM, and balance dysfunction. Due to these impairments, patient has difficulty performing ADL's, work-related activities, ambulation. Patient's clinical presentation is consistent with their referring diagnosis of Chronic LBP with radiculopathy.. Patient has been educated in home exercise program and plan of care. Patient would benefit from skilled physical therapy services to address their aforementioned functional limitations and progress towards prior level of function and independence with home exercise program.     Understanding of Dx/Px/POC: good     Prognosis: good    Goals  Short term goals to be accomplished in 3 weeks:  STG 1: Pt will demo independence with postural management  STG 2: Pt will demo I with HEP to maximize progress between therapy sessions  STG 3: Pt will demo L/S AROM < or = min loss throughout to promote improved functional mobility and body mechanics  STG 4: Pt will demo 1/2 MMT grade L ankle DF to improve balance  STG 5: Pt will reports pain dec freq and intensity 50%      Long term goals to be accomplished in 6 weeks:  LTG 1: Pt will demo good body mech with >75% functional challenges to prevent reinjury  LTG 2: Pt will be able to return to work and long distance amb pain free as per PLOF  LTG 3: Pt will demo Good  strength L ankle DF to maximize balance and safety with amb    Plan  Patient would benefit from: PT eval and skilled physical therapy  Planned modality interventions: cryotherapy, thermotherapy: hydrocollator packs and traction    Planned therapy interventions: manual therapy, neuromuscular re-education, self care, therapeutic activities, therapeutic exercise and home exercise program    Frequency: 2x week  Duration in weeks: 6  Treatment plan discussed with: patient  Plan details:  HEP development, stretching, strengthening, A/AA/PROM, joint mobilizations, posture education, STM/MI as needed to reduce muscle tension, muscle reeducation, PLOC discussed and agreed upon with patient.          Subjective Evaluation    History of Present Illness  Mechanism of injury: Pt has had symptoms for approx 4 years that felt like a burning pain in L shin but never in R. PCP orders xrays of lumbar spine. He had seen orthopedics and sent him to PT. MRI performed recently indicative of herniation. Pt has lost 25 lbs intentionally recently. He had done PT which he felt was making all of his symptoms worse, in early 2023, which he felt made his symptoms worsen, so he discontinued, and he then felt better afterward. He went on to do his own exercises including bridges which was helpful and he continues to do those stretches to present day, core stabilization based. Pt symptoms had been worsening but recently has drastically improved.     Overall pt feels better with bending forward and leaning on shopping cart and sitting. Pt is a  and is leaning forward at work and does not have pain while working. However when pt does feel symptoms it is in both shins. Recently he even notices cramping in his legs. Steroid injections within the last 2 years, not specifically helpful for him. He has tingling and numbness in bottom of toe on L foot. Working overhead really aggravates his symptoms.     He has been contemplating another approach  with decompression therapy, lasers and ultrasound.     Pt goals include return to long distance walking without pain. Currently walking with efforts to avoid lumbar extension. 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 Overall pt feels he is generally staying the same.     Pt is not taking any medicine for pain.   Quality of life: good    Pain  Current pain ratin (numbness and tingling)  At best pain ratin  At worst pain ratin          Objective     Concurrent Complaints  Negative for night pain and disturbed sleep    Postural Observations  Seated posture: fair  Standing posture: fair      Neurological Testing     Sensation     Lumbar   Left   Intact: light touch and sharp/dull discrimination  Diminished: light touch    Right   Intact: light touch  Diminished: sharp/dull discrimination    Reflexes   Left   Patellar (L4): trace (1+)  Achilles (S1): trace (1+)    Right   Patellar (L4): trace (1+)  Achilles (S1): trace (1+)    Active Range of Motion     Lumbar   Flexion:  Restriction level: minimal  Extension:  Restriction level: moderate  Left lateral flexion: Active left lumbar lateral flexion: dec/NB.    Restriction level: minimal  Right lateral flexion: Active right lumbar lateral flexion: dec/NB.  Restriction level: minimal  Mechanical Assessment    Cervical      Thoracic      Lumbar    Standing flexion: repeated movements   Pain location:centralized  Pain intensity: better  Pain level: decreased    Strength/Myotome Testing     Additional Strength Details  R ankle DF 5/5 MMT  L ankle DF 3/5 MMT giveway strength    Unable to heel walk on L  SLS R WNL  SLS L dec             Eval/ Re-eval Auth #/ Referral # Total visits Start date  Expiration date Total active units  Total manual units  PT only or  PT+OT?    Requested                                                        Date:           Total authorized units:  Active:             Manual:           Total remaining units:  Active:               Manual:                Date:           Total authorized units: Active:            Manual:           Total remaining units:  Active:               Manual:                    Precautions: Standard.      Visit 1       7/18/24                           Neuro Re-Ed                                                        Ther Ex       RFIStand with self OP x10                                                       Ther Activity                                          Modalities                           no weight-bearing restrictions

## 2025-01-03 NOTE — PROGRESS NOTE ADULT - REASON FOR ADMISSION
Your blood tests and abdominal scan looked healthy today. Your abdominal pain could be due to a muscle strain or hernia that was not seen on imaging.    I recommend tylenol and/or ibuprofen as needed for pain. I would avoid heavy lifting/exertion and activities that worsen your pain.     If it is due to muscle strain it should heal on it's own with rest and gentle stretching. If it is not getting better, I would recommend following up with your primary care provider for re-evaluation. If you develop sudden severe pain, fever, vomiting, urinary changes or bowel changes, or if other concerning symptoms develop please return to the ER for evaluation.   
Syncope

## 2025-05-17 NOTE — BH CONSULTATION LIAISON ASSESSMENT NOTE - NSBHPSYCHOLCOGABN_PSY_A_CORE
Clear bilaterally, pupils equal, round and reactive to light.
disoriented to time/disoriented to place/disoriented to situation